# Patient Record
Sex: MALE | Race: WHITE | NOT HISPANIC OR LATINO | URBAN - METROPOLITAN AREA
[De-identification: names, ages, dates, MRNs, and addresses within clinical notes are randomized per-mention and may not be internally consistent; named-entity substitution may affect disease eponyms.]

---

## 2017-02-16 ENCOUNTER — EMERGENCY (EMERGENCY)
Facility: HOSPITAL | Age: 72
LOS: 0 days | Discharge: HOME | End: 2017-02-16
Attending: EMERGENCY MEDICINE
Payer: MEDICARE

## 2017-02-16 VITALS
TEMPERATURE: 98 F | OXYGEN SATURATION: 99 % | RESPIRATION RATE: 18 BRPM | HEIGHT: 70 IN | SYSTOLIC BLOOD PRESSURE: 134 MMHG | DIASTOLIC BLOOD PRESSURE: 79 MMHG | HEART RATE: 91 BPM | WEIGHT: 169.98 LBS

## 2017-02-16 DIAGNOSIS — Z90.5 ACQUIRED ABSENCE OF KIDNEY: Chronic | ICD-10-CM

## 2017-02-16 DIAGNOSIS — Z79.82 LONG TERM (CURRENT) USE OF ASPIRIN: ICD-10-CM

## 2017-02-16 DIAGNOSIS — M25.561 PAIN IN RIGHT KNEE: ICD-10-CM

## 2017-02-16 PROCEDURE — 73562 X-RAY EXAM OF KNEE 3: CPT | Mod: 26,RT

## 2017-02-16 PROCEDURE — 99283 EMERGENCY DEPT VISIT LOW MDM: CPT

## 2017-02-16 RX ORDER — IBUPROFEN 200 MG
600 TABLET ORAL ONCE
Qty: 0 | Refills: 0 | Status: COMPLETED | OUTPATIENT
Start: 2017-02-16 | End: 2017-02-16

## 2017-02-16 RX ORDER — TRAMADOL HYDROCHLORIDE 50 MG/1
1 TABLET ORAL
Qty: 12 | Refills: 0 | OUTPATIENT
Start: 2017-02-16 | End: 2017-02-19

## 2017-02-16 RX ADMIN — Medication 600 MILLIGRAM(S): at 12:21

## 2017-02-16 RX ADMIN — Medication 600 MILLIGRAM(S): at 11:50

## 2017-02-16 NOTE — ED PROVIDER NOTE - OBJECTIVE STATEMENT
72yo male with pmh HL presents with rt knee pain and swelling x 4 days. No analgesia taken.  Pt with similar pain 3 years ago and had his orthopedist tap it and states it was "joint fluid" and was not give any abx.  Pt has appt on 2/27 with ortho. denies fever, trauma, erythema. Ortho: dr. penny caal [phone]    No fever/chills. No photophobia/eye pain/changes in vision, No ear pain/sore throat/dysphagia, No chest pain/palpitations. No SOB/cough/stridor. No abdominal pain, N/V/D, no black/bloody bm. No dysuria/frequency/discharge, No headache. No Dizziness.  No rash.  No numbness/tingling/weakness.

## 2017-02-16 NOTE — ED PROVIDER NOTE - MEDICAL DECISION MAKING DETAILS
Pt well appearing, unlikely septic joint, likely gout vs pseudogout given history of similar in past.  pt has appt with orthopedist. for nsaids and fu. Discussed results and outcome of testing with the patient.  Patient given copy of available results. Patient advised to please follow up with their PMD within the next 24 hours and return to the Emergency Department for worsening symptoms or any other concerns.

## 2017-02-16 NOTE — ED ADULT NURSE NOTE - OBJECTIVE STATEMENT
Patient stated that he has been having pain to right knee for the past 4 days, denies injury, denies fall

## 2017-02-16 NOTE — ED ADULT TRIAGE NOTE - CHIEF COMPLAINT QUOTE
swelling and pain to right knee times 4 days. pt denies any injury. pt has history of high cholesterol

## 2017-02-16 NOTE — ED ADULT NURSE NOTE - PMH
BPH (benign prostatic hyperplasia)    History of kidney cancer  right kidney removed  HLD (hyperlipidemia)

## 2017-02-21 ENCOUNTER — APPOINTMENT (OUTPATIENT)
Dept: ORTHOPEDIC SURGERY | Facility: CLINIC | Age: 72
End: 2017-02-21

## 2017-02-21 VITALS
HEIGHT: 70 IN | WEIGHT: 170 LBS | BODY MASS INDEX: 24.34 KG/M2 | HEART RATE: 88 BPM | SYSTOLIC BLOOD PRESSURE: 136 MMHG | DIASTOLIC BLOOD PRESSURE: 81 MMHG

## 2017-02-27 ENCOUNTER — APPOINTMENT (OUTPATIENT)
Dept: ORTHOPEDIC SURGERY | Facility: CLINIC | Age: 72
End: 2017-02-27

## 2017-03-03 ENCOUNTER — APPOINTMENT (OUTPATIENT)
Dept: ORTHOPEDIC SURGERY | Facility: CLINIC | Age: 72
End: 2017-03-03

## 2017-03-03 VITALS
DIASTOLIC BLOOD PRESSURE: 64 MMHG | HEART RATE: 73 BPM | SYSTOLIC BLOOD PRESSURE: 109 MMHG | BODY MASS INDEX: 24.34 KG/M2 | WEIGHT: 170 LBS | HEIGHT: 70 IN

## 2017-03-09 ENCOUNTER — APPOINTMENT (OUTPATIENT)
Dept: ORTHOPEDIC SURGERY | Facility: CLINIC | Age: 72
End: 2017-03-09

## 2017-03-09 VITALS
BODY MASS INDEX: 24.34 KG/M2 | WEIGHT: 170 LBS | HEART RATE: 84 BPM | DIASTOLIC BLOOD PRESSURE: 76 MMHG | SYSTOLIC BLOOD PRESSURE: 115 MMHG | HEIGHT: 70 IN

## 2017-03-15 ENCOUNTER — APPOINTMENT (OUTPATIENT)
Dept: ORTHOPEDIC SURGERY | Facility: CLINIC | Age: 72
End: 2017-03-15

## 2017-03-23 ENCOUNTER — APPOINTMENT (OUTPATIENT)
Dept: ORTHOPEDIC SURGERY | Facility: CLINIC | Age: 72
End: 2017-03-23

## 2017-03-23 VITALS — HEART RATE: 90 BPM | SYSTOLIC BLOOD PRESSURE: 108 MMHG | DIASTOLIC BLOOD PRESSURE: 67 MMHG

## 2017-04-13 ENCOUNTER — APPOINTMENT (OUTPATIENT)
Dept: ORTHOPEDIC SURGERY | Facility: CLINIC | Age: 72
End: 2017-04-13

## 2017-04-13 ENCOUNTER — FORM ENCOUNTER (OUTPATIENT)
Age: 72
End: 2017-04-13

## 2017-04-13 VITALS
HEART RATE: 78 BPM | HEIGHT: 70 IN | SYSTOLIC BLOOD PRESSURE: 120 MMHG | DIASTOLIC BLOOD PRESSURE: 74 MMHG | BODY MASS INDEX: 24.34 KG/M2 | WEIGHT: 170 LBS

## 2017-04-14 ENCOUNTER — APPOINTMENT (OUTPATIENT)
Dept: MRI IMAGING | Facility: CLINIC | Age: 72
End: 2017-04-14

## 2017-04-14 ENCOUNTER — OUTPATIENT (OUTPATIENT)
Dept: OUTPATIENT SERVICES | Facility: HOSPITAL | Age: 72
LOS: 1 days | End: 2017-04-14
Payer: MEDICARE

## 2017-04-14 DIAGNOSIS — Z90.5 ACQUIRED ABSENCE OF KIDNEY: Chronic | ICD-10-CM

## 2017-04-14 DIAGNOSIS — M17.11 UNILATERAL PRIMARY OSTEOARTHRITIS, RIGHT KNEE: ICD-10-CM

## 2017-04-14 PROCEDURE — 73721 MRI JNT OF LWR EXTRE W/O DYE: CPT

## 2017-04-17 ENCOUNTER — RESULT REVIEW (OUTPATIENT)
Age: 72
End: 2017-04-17

## 2017-04-18 ENCOUNTER — RESULT REVIEW (OUTPATIENT)
Age: 72
End: 2017-04-18

## 2017-05-05 ENCOUNTER — OUTPATIENT (OUTPATIENT)
Dept: OUTPATIENT SERVICES | Facility: HOSPITAL | Age: 72
LOS: 1 days | End: 2017-05-05
Payer: MEDICARE

## 2017-05-05 VITALS
HEART RATE: 82 BPM | WEIGHT: 166.01 LBS | HEIGHT: 68 IN | DIASTOLIC BLOOD PRESSURE: 80 MMHG | RESPIRATION RATE: 18 BRPM | SYSTOLIC BLOOD PRESSURE: 138 MMHG | TEMPERATURE: 97 F

## 2017-05-05 DIAGNOSIS — M19.90 UNSPECIFIED OSTEOARTHRITIS, UNSPECIFIED SITE: ICD-10-CM

## 2017-05-05 DIAGNOSIS — S83.241A OTHER TEAR OF MEDIAL MENISCUS, CURRENT INJURY, RIGHT KNEE, INITIAL ENCOUNTER: ICD-10-CM

## 2017-05-05 DIAGNOSIS — Z90.5 ACQUIRED ABSENCE OF KIDNEY: Chronic | ICD-10-CM

## 2017-05-05 LAB
BUN SERPL-MCNC: 16 MG/DL — SIGNIFICANT CHANGE UP (ref 7–23)
CALCIUM SERPL-MCNC: 9.9 MG/DL — SIGNIFICANT CHANGE UP (ref 8.4–10.5)
CHLORIDE SERPL-SCNC: 99 MMOL/L — SIGNIFICANT CHANGE UP (ref 98–107)
CO2 SERPL-SCNC: 28 MMOL/L — SIGNIFICANT CHANGE UP (ref 22–31)
CREAT SERPL-MCNC: 1.19 MG/DL — SIGNIFICANT CHANGE UP (ref 0.5–1.3)
GLUCOSE SERPL-MCNC: 106 MG/DL — HIGH (ref 70–99)
HCT VFR BLD CALC: 45.8 % — SIGNIFICANT CHANGE UP (ref 39–50)
HGB BLD-MCNC: 15.1 G/DL — SIGNIFICANT CHANGE UP (ref 13–17)
MCHC RBC-ENTMCNC: 30.3 PG — SIGNIFICANT CHANGE UP (ref 27–34)
MCHC RBC-ENTMCNC: 33 % — SIGNIFICANT CHANGE UP (ref 32–36)
MCV RBC AUTO: 92 FL — SIGNIFICANT CHANGE UP (ref 80–100)
PLATELET # BLD AUTO: 288 K/UL — SIGNIFICANT CHANGE UP (ref 150–400)
PMV BLD: 11.4 FL — SIGNIFICANT CHANGE UP (ref 7–13)
POTASSIUM SERPL-MCNC: 3.9 MMOL/L — SIGNIFICANT CHANGE UP (ref 3.5–5.3)
POTASSIUM SERPL-SCNC: 3.9 MMOL/L — SIGNIFICANT CHANGE UP (ref 3.5–5.3)
RBC # BLD: 4.98 M/UL — SIGNIFICANT CHANGE UP (ref 4.2–5.8)
RBC # FLD: 13.5 % — SIGNIFICANT CHANGE UP (ref 10.3–14.5)
SODIUM SERPL-SCNC: 140 MMOL/L — SIGNIFICANT CHANGE UP (ref 135–145)
WBC # BLD: 8.39 K/UL — SIGNIFICANT CHANGE UP (ref 3.8–10.5)
WBC # FLD AUTO: 8.39 K/UL — SIGNIFICANT CHANGE UP (ref 3.8–10.5)

## 2017-05-05 PROCEDURE — 93010 ELECTROCARDIOGRAM REPORT: CPT

## 2017-05-05 NOTE — H&P PST ADULT - VENOUS THROMBOEMBOLISM CURRENT STATUS
major surgery, including arthroscopic and laparoscopic (greater than 1 hr) major surgery lasting 2-3 hrs

## 2017-05-05 NOTE — H&P PST ADULT - PMH
BPH (benign prostatic hyperplasia)    History of kidney cancer  right kidney removed  HLD (hyperlipidemia)    Osteoarthritis BPH (benign prostatic hyperplasia)    History of kidney cancer  right kidney removed  HLD (hyperlipidemia)    Osteoarthritis    URI (upper respiratory infection)  2 wks ago and took Augmentin

## 2017-05-05 NOTE — H&P PST ADULT - PROBLEM SELECTOR PLAN 1
Scheduled for right knee arthroscopy and menisectomy on 5/18/17.   labs pending,  EKG in chart,  Preop instructions provided to pt,  Famotidine and chlorhexidine scrub provided  to pt.

## 2017-05-05 NOTE — H&P PST ADULT - NEGATIVE GASTROINTESTINAL SYMPTOMS
no change in bowel habits/no diarrhea/no nausea/no vomiting/no constipation/no melena/no abdominal pain/no hematochezia

## 2017-05-05 NOTE — H&P PST ADULT - NEGATIVE ENMT SYMPTOMS
no tinnitus/no dry mouth/no ear pain/no sinus symptoms/no dysphagia/no recurrent cold sores/no throat pain/no vertigo/no nasal obstruction/no hearing difficulty/no post-nasal discharge/no nose bleeds

## 2017-05-05 NOTE — H&P PST ADULT - HISTORY OF PRESENT ILLNESS
69 yo male with h/o HLD and right nephrectomy, and with h/o osteoarthritis presents for preop eval to have  right knee arthroscopy and menisectomy on 5/18/17. Pt stated that he started having pain 2 months ago and received cortisone injection x 1 with some relief of pain but the right knee continues to hurt. MRI showed torn meniscus.

## 2017-05-05 NOTE — H&P PST ADULT - NSANTHOSAYNRD_GEN_A_CORE
No. CASSIDY screening performed.  STOP BANG Legend: 0-2 = LOW Risk; 3-4 = INTERMEDIATE Risk; 5-8 = HIGH Risk/never tested

## 2017-05-05 NOTE — H&P PST ADULT - ASSESSMENT
71 yo male with h/o HLD and right nephrectomy, and with h/o osteoarthritis presents for preop eval to have  right knee arthroscopy and menisectomy on 5/18/17.

## 2017-05-05 NOTE — H&P PST ADULT - RS GEN PE MLT RESP DETAILS PC
no wheezes/good air movement/no rhonchi/breath sounds equal/no subcutaneous emphysema/clear to auscultation bilaterally/airway patent/no intercostal retractions/no rales

## 2017-05-18 ENCOUNTER — OUTPATIENT (OUTPATIENT)
Dept: OUTPATIENT SERVICES | Facility: HOSPITAL | Age: 72
LOS: 1 days | Discharge: HOME | End: 2017-05-18
Payer: MEDICARE

## 2017-05-18 ENCOUNTER — TRANSCRIPTION ENCOUNTER (OUTPATIENT)
Age: 72
End: 2017-05-18

## 2017-05-18 ENCOUNTER — APPOINTMENT (OUTPATIENT)
Dept: ORTHOPEDIC SURGERY | Facility: AMBULATORY SURGERY CENTER | Age: 72
End: 2017-05-18

## 2017-05-18 VITALS
TEMPERATURE: 98 F | HEIGHT: 68 IN | DIASTOLIC BLOOD PRESSURE: 74 MMHG | SYSTOLIC BLOOD PRESSURE: 134 MMHG | HEART RATE: 88 BPM | OXYGEN SATURATION: 98 % | RESPIRATION RATE: 16 BRPM | WEIGHT: 166.01 LBS

## 2017-05-18 VITALS
OXYGEN SATURATION: 99 % | SYSTOLIC BLOOD PRESSURE: 114 MMHG | DIASTOLIC BLOOD PRESSURE: 76 MMHG | TEMPERATURE: 98 F | HEART RATE: 80 BPM | RESPIRATION RATE: 12 BRPM

## 2017-05-18 DIAGNOSIS — S83.241A OTHER TEAR OF MEDIAL MENISCUS, CURRENT INJURY, RIGHT KNEE, INITIAL ENCOUNTER: ICD-10-CM

## 2017-05-18 DIAGNOSIS — Z90.5 ACQUIRED ABSENCE OF KIDNEY: Chronic | ICD-10-CM

## 2017-05-18 PROCEDURE — 29881 ARTHRS KNE SRG MNISECTMY M/L: CPT | Mod: RT

## 2017-05-18 RX ORDER — ASPIRIN/CALCIUM CARB/MAGNESIUM 324 MG
1 TABLET ORAL
Qty: 14 | Refills: 0 | OUTPATIENT
Start: 2017-05-18 | End: 2017-06-01

## 2017-05-18 NOTE — ASU DISCHARGE PLAN (ADULT/PEDIATRIC). - NOTIFY
Bleeding that does not stop/Swelling that continues/Persistent Nausea and Vomiting/Unable to Urinate/Numbness, color, or temperature change to extremity/Fever greater than 101/Pain not relieved by Medications

## 2017-05-18 NOTE — ASU DISCHARGE PLAN (ADULT/PEDIATRIC). - ACTIVITY LEVEL
no sports/gym/No strenuous activity. No sports or running./no heavy lifting/no exercise/elevate extremity

## 2017-05-18 NOTE — ASU DISCHARGE PLAN (ADULT/PEDIATRIC). - SPECIAL INSTRUCTIONS
Please refer to MD pre-printed instruction sheet.  Apply ice packs on top of bandage every 2 hours for 20 minutes at a time.   Resume leg strengthening exercises/physical therapy as directed by Dr. Cruz.

## 2017-05-26 ENCOUNTER — APPOINTMENT (OUTPATIENT)
Dept: ORTHOPEDIC SURGERY | Facility: CLINIC | Age: 72
End: 2017-05-26

## 2017-05-26 VITALS
HEART RATE: 85 BPM | HEIGHT: 70 IN | DIASTOLIC BLOOD PRESSURE: 85 MMHG | BODY MASS INDEX: 24.34 KG/M2 | WEIGHT: 170 LBS | SYSTOLIC BLOOD PRESSURE: 144 MMHG

## 2017-06-29 ENCOUNTER — APPOINTMENT (OUTPATIENT)
Dept: ORTHOPEDIC SURGERY | Facility: CLINIC | Age: 72
End: 2017-06-29

## 2017-06-29 VITALS
SYSTOLIC BLOOD PRESSURE: 117 MMHG | HEIGHT: 70 IN | BODY MASS INDEX: 24.34 KG/M2 | WEIGHT: 170 LBS | HEART RATE: 88 BPM | DIASTOLIC BLOOD PRESSURE: 76 MMHG

## 2017-06-29 DIAGNOSIS — S83.231D COMPLEX TEAR OF MEDIAL MENISCUS, CURRENT INJURY, RIGHT KNEE, SUBSEQUENT ENCOUNTER: ICD-10-CM

## 2018-03-06 ENCOUNTER — APPOINTMENT (OUTPATIENT)
Dept: ORTHOPEDIC SURGERY | Facility: CLINIC | Age: 73
End: 2018-03-06
Payer: MEDICARE

## 2018-03-06 VITALS — DIASTOLIC BLOOD PRESSURE: 76 MMHG | HEART RATE: 85 BPM | SYSTOLIC BLOOD PRESSURE: 120 MMHG

## 2018-03-06 DIAGNOSIS — M17.11 UNILATERAL PRIMARY OSTEOARTHRITIS, RIGHT KNEE: ICD-10-CM

## 2018-03-06 PROCEDURE — 20610 DRAIN/INJ JOINT/BURSA W/O US: CPT | Mod: RT

## 2018-03-06 PROCEDURE — 99213 OFFICE O/P EST LOW 20 MIN: CPT | Mod: 25

## 2018-06-27 ENCOUNTER — APPOINTMENT (OUTPATIENT)
Dept: UROLOGY | Facility: CLINIC | Age: 73
End: 2018-06-27
Payer: MEDICARE

## 2018-06-27 VITALS
HEART RATE: 85 BPM | DIASTOLIC BLOOD PRESSURE: 81 MMHG | SYSTOLIC BLOOD PRESSURE: 123 MMHG | RESPIRATION RATE: 16 BRPM | HEIGHT: 70 IN | TEMPERATURE: 98.1 F

## 2018-06-27 LAB
ANION GAP SERPL CALC-SCNC: 16 MMOL/L
APPEARANCE: CLEAR
BACTERIA: NEGATIVE
BILIRUBIN URINE: NEGATIVE
BLOOD URINE: NEGATIVE
BUN SERPL-MCNC: 14 MG/DL
CALCIUM SERPL-MCNC: 9.4 MG/DL
CHLORIDE SERPL-SCNC: 99 MMOL/L
CO2 SERPL-SCNC: 24 MMOL/L
COLOR: YELLOW
CREAT SERPL-MCNC: 1.09 MG/DL
GLUCOSE QUALITATIVE U: NEGATIVE MG/DL
GLUCOSE SERPL-MCNC: 108 MG/DL
KETONES URINE: NEGATIVE
LEUKOCYTE ESTERASE URINE: NEGATIVE
MICROSCOPIC-UA: NORMAL
NITRITE URINE: NEGATIVE
PH URINE: 6
POTASSIUM SERPL-SCNC: 4.4 MMOL/L
PROTEIN URINE: NEGATIVE MG/DL
RED BLOOD CELLS URINE: 1 /HPF
SODIUM SERPL-SCNC: 139 MMOL/L
SPECIFIC GRAVITY URINE: 1.01
SQUAMOUS EPITHELIAL CELLS: 0 /HPF
UROBILINOGEN URINE: NEGATIVE MG/DL
WHITE BLOOD CELLS URINE: 0 /HPF

## 2018-06-27 PROCEDURE — 99204 OFFICE O/P NEW MOD 45 MIN: CPT

## 2018-06-28 LAB
PSA FREE FLD-MCNC: 29.6
PSA FREE SERPL-MCNC: 0.59 NG/ML
PSA SERPL-MCNC: 1.99 NG/ML

## 2018-06-29 LAB
BACTERIA UR CULT: NORMAL
CORE LAB FLUID CYTOLOGY: NORMAL

## 2018-12-12 PROBLEM — M19.90 UNSPECIFIED OSTEOARTHRITIS, UNSPECIFIED SITE: Chronic | Status: ACTIVE | Noted: 2017-05-05

## 2018-12-17 ENCOUNTER — OUTPATIENT (OUTPATIENT)
Dept: OUTPATIENT SERVICES | Facility: HOSPITAL | Age: 73
LOS: 1 days | End: 2018-12-17
Payer: MEDICARE

## 2018-12-17 VITALS
HEIGHT: 68 IN | SYSTOLIC BLOOD PRESSURE: 130 MMHG | OXYGEN SATURATION: 99 % | WEIGHT: 175.05 LBS | DIASTOLIC BLOOD PRESSURE: 84 MMHG | HEART RATE: 83 BPM | TEMPERATURE: 97 F | RESPIRATION RATE: 16 BRPM

## 2018-12-17 DIAGNOSIS — Z98.890 OTHER SPECIFIED POSTPROCEDURAL STATES: Chronic | ICD-10-CM

## 2018-12-17 DIAGNOSIS — R91.1 SOLITARY PULMONARY NODULE: ICD-10-CM

## 2018-12-17 DIAGNOSIS — Z90.5 ACQUIRED ABSENCE OF KIDNEY: Chronic | ICD-10-CM

## 2018-12-17 LAB
BUN SERPL-MCNC: 15 MG/DL — SIGNIFICANT CHANGE UP (ref 7–23)
CALCIUM SERPL-MCNC: 9.3 MG/DL — SIGNIFICANT CHANGE UP (ref 8.4–10.5)
CHLORIDE SERPL-SCNC: 100 MMOL/L — SIGNIFICANT CHANGE UP (ref 98–107)
CO2 SERPL-SCNC: 27 MMOL/L — SIGNIFICANT CHANGE UP (ref 22–31)
CREAT SERPL-MCNC: 1.18 MG/DL — SIGNIFICANT CHANGE UP (ref 0.5–1.3)
GLUCOSE SERPL-MCNC: 97 MG/DL — SIGNIFICANT CHANGE UP (ref 70–99)
HCT VFR BLD CALC: 44.3 % — SIGNIFICANT CHANGE UP (ref 39–50)
HGB BLD-MCNC: 14.4 G/DL — SIGNIFICANT CHANGE UP (ref 13–17)
MCHC RBC-ENTMCNC: 30.3 PG — SIGNIFICANT CHANGE UP (ref 27–34)
MCHC RBC-ENTMCNC: 32.5 % — SIGNIFICANT CHANGE UP (ref 32–36)
MCV RBC AUTO: 93.3 FL — SIGNIFICANT CHANGE UP (ref 80–100)
NRBC # FLD: 0 — SIGNIFICANT CHANGE UP
PLATELET # BLD AUTO: 243 K/UL — SIGNIFICANT CHANGE UP (ref 150–400)
PMV BLD: 11.4 FL — SIGNIFICANT CHANGE UP (ref 7–13)
POTASSIUM SERPL-MCNC: 4 MMOL/L — SIGNIFICANT CHANGE UP (ref 3.5–5.3)
POTASSIUM SERPL-SCNC: 4 MMOL/L — SIGNIFICANT CHANGE UP (ref 3.5–5.3)
RBC # BLD: 4.75 M/UL — SIGNIFICANT CHANGE UP (ref 4.2–5.8)
RBC # FLD: 12.9 % — SIGNIFICANT CHANGE UP (ref 10.3–14.5)
SODIUM SERPL-SCNC: 140 MMOL/L — SIGNIFICANT CHANGE UP (ref 135–145)
WBC # BLD: 9.06 K/UL — SIGNIFICANT CHANGE UP (ref 3.8–10.5)
WBC # FLD AUTO: 9.06 K/UL — SIGNIFICANT CHANGE UP (ref 3.8–10.5)

## 2018-12-17 PROCEDURE — 93010 ELECTROCARDIOGRAM REPORT: CPT

## 2018-12-17 RX ORDER — SODIUM CHLORIDE 9 MG/ML
1000 INJECTION, SOLUTION INTRAVENOUS
Qty: 0 | Refills: 0 | Status: DISCONTINUED | OUTPATIENT
Start: 2019-01-03 | End: 2019-01-18

## 2018-12-17 RX ORDER — ATORVASTATIN CALCIUM 80 MG/1
1 TABLET, FILM COATED ORAL
Qty: 0 | Refills: 0 | COMMUNITY

## 2018-12-17 NOTE — H&P PST ADULT - PROBLEM SELECTOR PLAN 1
Scheduled for right knee arthroscopy and menisectomy on 5/18/17.   labs pending,  EKG in chart,  Preop instructions provided to pt,  Famotidine and chlorhexidine scrub provided  to pt. Pt is scheduled for navigation endobronchial ultrasound bronchoscopy, radial endobronchial ultrasounds bronchoscopy fluoro for 1/3/19. Pre-op instructions provided. Pepcid provided for GI prophylaxis. Pt stated understanding.     Takes ASA for cardiac prophylaxis, last dose on 12/27.    CASSIDY precautions, OR booking notified    Pending copy of stress test from 2018.

## 2018-12-17 NOTE — H&P PST ADULT - PMH
BPH (benign prostatic hyperplasia)    History of kidney cancer  right kidney removed  HLD (hyperlipidemia)    Osteoarthritis    Solitary pulmonary nodule

## 2018-12-17 NOTE — H&P PST ADULT - NSANTHOSAYNRD_GEN_A_CORE
never tested/No. CASSIDY screening performed.  STOP BANG Legend: 0-2 = LOW Risk; 3-4 = INTERMEDIATE Risk; 5-8 = HIGH Risk

## 2018-12-17 NOTE — H&P PST ADULT - MUSCULOSKELETAL
detailed exam details… ROM intact/no joint warmth/no calf tenderness/normal strength/no joint swelling/no joint erythema

## 2018-12-17 NOTE — H&P PST ADULT - PRIMARY CARE PROVIDER
Dr Crisostomo  [phone]                    Dr Soni Cards  Dr Crisostomo  [phone]                                                           Dr Soni Cards

## 2018-12-17 NOTE — H&P PST ADULT - NEGATIVE NEUROLOGICAL SYMPTOMS
no paresthesias/no generalized seizures/no syncope/no tremors/no loss of sensation/no headache/no difficulty walking/no transient paralysis/no weakness

## 2018-12-17 NOTE — H&P PST ADULT - HISTORY OF PRESENT ILLNESS
73 year old male presents to presurgical testing with diagnosis of solitary pulmonary nodule scheduled for navigation endobronchial ultrasound bronchoscopy, radial endobronchial ultrasounds bronchoscopy fluoro for 1/3/19. Pt with hx of pulmonary nodule, on right side, found incidentally 2 years ago, surveillance CT chest found an increase in size of pulmonary nodule. Pt denies cough or shortness of breath.

## 2018-12-17 NOTE — H&P PST ADULT - NEGATIVE ENMT SYMPTOMS
no ear pain/no tinnitus/no nasal obstruction/no post-nasal discharge/no nose bleeds/no vertigo/no sinus symptoms/no recurrent cold sores/no dry mouth/no throat pain/no dysphagia/no hearing difficulty

## 2018-12-17 NOTE — H&P PST ADULT - RS GEN PE MLT RESP DETAILS PC
clear to auscultation bilaterally/no rhonchi/no wheezes/breath sounds equal/good air movement/no rales/airway patent

## 2018-12-17 NOTE — H&P PST ADULT - NEGATIVE OPHTHALMOLOGIC SYMPTOMS
no blurred vision L/no pain R/no loss of vision L/no diplopia/no blurred vision R/no loss of vision R/no photophobia/no pain L

## 2018-12-17 NOTE — H&P PST ADULT - NEGATIVE GASTROINTESTINAL SYMPTOMS
no constipation/no change in bowel habits/no nausea/no vomiting/no abdominal pain/no hematochezia/no diarrhea/no melena

## 2018-12-18 PROBLEM — J06.9 ACUTE UPPER RESPIRATORY INFECTION, UNSPECIFIED: Chronic | Status: INACTIVE | Noted: 2017-05-05 | Resolved: 2018-12-17

## 2019-01-02 ENCOUNTER — FORM ENCOUNTER (OUTPATIENT)
Age: 74
End: 2019-01-02

## 2019-01-03 ENCOUNTER — RESULT REVIEW (OUTPATIENT)
Age: 74
End: 2019-01-03

## 2019-01-03 ENCOUNTER — OUTPATIENT (OUTPATIENT)
Dept: OUTPATIENT SERVICES | Facility: HOSPITAL | Age: 74
LOS: 1 days | Discharge: HOME | End: 2019-01-03
Payer: MEDICARE

## 2019-01-03 ENCOUNTER — APPOINTMENT (OUTPATIENT)
Dept: PULMONOLOGY | Facility: HOSPITAL | Age: 74
End: 2019-01-03

## 2019-01-03 VITALS
OXYGEN SATURATION: 96 % | SYSTOLIC BLOOD PRESSURE: 100 MMHG | DIASTOLIC BLOOD PRESSURE: 85 MMHG | RESPIRATION RATE: 16 BRPM | HEART RATE: 84 BPM

## 2019-01-03 VITALS
SYSTOLIC BLOOD PRESSURE: 135 MMHG | DIASTOLIC BLOOD PRESSURE: 81 MMHG | HEART RATE: 92 BPM | HEIGHT: 68 IN | OXYGEN SATURATION: 98 % | RESPIRATION RATE: 16 BRPM | TEMPERATURE: 98 F | WEIGHT: 175.05 LBS

## 2019-01-03 DIAGNOSIS — Z98.890 OTHER SPECIFIED POSTPROCEDURAL STATES: Chronic | ICD-10-CM

## 2019-01-03 DIAGNOSIS — Z90.5 ACQUIRED ABSENCE OF KIDNEY: Chronic | ICD-10-CM

## 2019-01-03 DIAGNOSIS — R91.1 SOLITARY PULMONARY NODULE: ICD-10-CM

## 2019-01-03 LAB
GRAM STN SPT: SIGNIFICANT CHANGE UP
SPECIMEN SOURCE: SIGNIFICANT CHANGE UP

## 2019-01-03 PROCEDURE — 31624 DX BRONCHOSCOPE/LAVAGE: CPT | Mod: GC

## 2019-01-03 PROCEDURE — 31627 NAVIGATIONAL BRONCHOSCOPY: CPT | Mod: GC

## 2019-01-03 PROCEDURE — 71045 X-RAY EXAM CHEST 1 VIEW: CPT | Mod: 26

## 2019-01-03 PROCEDURE — 88312 SPECIAL STAINS GROUP 1: CPT | Mod: 26

## 2019-01-03 PROCEDURE — 31654 BRONCH EBUS IVNTJ PERPH LES: CPT | Mod: GC

## 2019-01-03 PROCEDURE — 88305 TISSUE EXAM BY PATHOLOGIST: CPT | Mod: 26

## 2019-01-03 PROCEDURE — 88173 CYTOPATH EVAL FNA REPORT: CPT | Mod: 26

## 2019-01-03 PROCEDURE — 88112 CYTOPATH CELL ENHANCE TECH: CPT | Mod: 26,59

## 2019-01-03 PROCEDURE — 31625 BRONCHOSCOPY W/BIOPSY(S): CPT | Mod: GC

## 2019-01-03 RX ORDER — ONDANSETRON 8 MG/1
4 TABLET, FILM COATED ORAL ONCE
Qty: 0 | Refills: 0 | Status: DISCONTINUED | OUTPATIENT
Start: 2019-01-03 | End: 2019-01-03

## 2019-01-03 RX ORDER — SODIUM CHLORIDE 9 MG/ML
1000 INJECTION, SOLUTION INTRAVENOUS
Qty: 0 | Refills: 0 | Status: DISCONTINUED | OUTPATIENT
Start: 2019-01-03 | End: 2019-01-03

## 2019-01-03 RX ORDER — FENTANYL CITRATE 50 UG/ML
25 INJECTION INTRAVENOUS
Qty: 0 | Refills: 0 | Status: DISCONTINUED | OUTPATIENT
Start: 2019-01-03 | End: 2019-01-03

## 2019-01-03 NOTE — ASU DISCHARGE PLAN (ADULT/PEDIATRIC). - POST OP PHONE #
[phone] pt. granted permission to leave message /and or speak with whoever answers the phone.

## 2019-01-03 NOTE — ASU DISCHARGE PLAN (ADULT/PEDIATRIC). - NOTIFY
Unable to Urinate/Fever greater than 101/Bleeding that does not stop/Swelling that continues Persistent Nausea and Vomiting/Inability to Tolerate Liquids or Foods/Bleeding that does not stop/Fever greater than 101/Unable to Urinate/Swelling that continues

## 2019-01-03 NOTE — ASU DISCHARGE PLAN (ADULT/PEDIATRIC). - MEDICATION SUMMARY - MEDICATIONS TO TAKE
I will START or STAY ON the medications listed below when I get home from the hospital:    aspirin 81 mg oral tablet  -- 1 tab(s) by mouth once a day. last dsoe 12/27/2018  -- Indication: For prophylaxis    Flomax 0.4 mg oral capsule  -- 1 cap(s) by mouth once a day  -- Indication: For BPH    atorvastatin 10 mg oral tablet  -- 1 tab(s) by mouth once a day  -- Indication: For HLD    Garlic oral tablet  -- orally once a day. last dsoe 12/27/2018  -- Indication: For prophylaxis    Singulair 10 mg oral tablet  -- 1 tab(s) by mouth once a day  -- Indication: For rhinits    Multiple Vitamins oral tablet, dispersible  -- 1 tab(s) by mouth once a day. last dose 12/27/2018  -- Indication: For prophylaxis    Vitamin C  -- 1 tab(s) by mouth once a day  -- Indication: For prophylaxis

## 2019-01-04 PROBLEM — R91.1 SOLITARY PULMONARY NODULE: Chronic | Status: ACTIVE | Noted: 2018-12-17

## 2019-01-04 LAB
CULTURE - ACID FAST SMEAR CONCENTRATED: SIGNIFICANT CHANGE UP
SPECIMEN SOURCE: SIGNIFICANT CHANGE UP
SPECIMEN SOURCE: SIGNIFICANT CHANGE UP

## 2019-01-07 LAB — BACTERIA SPT RESP CULT: SIGNIFICANT CHANGE UP

## 2019-01-10 LAB — SPECIMEN SOURCE: SIGNIFICANT CHANGE UP

## 2019-01-16 ENCOUNTER — APPOINTMENT (OUTPATIENT)
Dept: UROLOGY | Facility: CLINIC | Age: 74
End: 2019-01-16
Payer: MEDICARE

## 2019-01-16 PROCEDURE — 99214 OFFICE O/P EST MOD 30 MIN: CPT

## 2019-01-17 LAB
APPEARANCE: CLEAR
BACTERIA: NEGATIVE
BILIRUBIN URINE: NEGATIVE
BLOOD URINE: NEGATIVE
COLOR: YELLOW
GLUCOSE QUALITATIVE U: NEGATIVE MG/DL
KETONES URINE: NEGATIVE
LEUKOCYTE ESTERASE URINE: NEGATIVE
MICROSCOPIC-UA: NORMAL
NITRITE URINE: NEGATIVE
PH URINE: 7
PROTEIN URINE: NEGATIVE MG/DL
PSA FREE FLD-MCNC: 24 %
PSA FREE SERPL-MCNC: 0.59 NG/ML
PSA SERPL-MCNC: 2.49 NG/ML
RED BLOOD CELLS URINE: 0 /HPF
SPECIFIC GRAVITY URINE: 1.01
SQUAMOUS EPITHELIAL CELLS: 0 /HPF
UROBILINOGEN URINE: NEGATIVE MG/DL
WHITE BLOOD CELLS URINE: 0 /HPF

## 2019-02-01 LAB — FUNGUS SPEC QL CULT: SIGNIFICANT CHANGE UP

## 2019-02-14 LAB — ACID FAST STN SPEC: SIGNIFICANT CHANGE UP

## 2019-03-06 ENCOUNTER — APPOINTMENT (OUTPATIENT)
Dept: THORACIC SURGERY | Facility: CLINIC | Age: 74
End: 2019-03-06
Payer: MEDICARE

## 2019-03-06 VITALS
WEIGHT: 178 LBS | BODY MASS INDEX: 26.36 KG/M2 | DIASTOLIC BLOOD PRESSURE: 89 MMHG | HEIGHT: 69 IN | SYSTOLIC BLOOD PRESSURE: 148 MMHG | HEART RATE: 86 BPM | OXYGEN SATURATION: 98 % | TEMPERATURE: 97.9 F | RESPIRATION RATE: 18 BRPM

## 2019-03-06 PROCEDURE — 99205 OFFICE O/P NEW HI 60 MIN: CPT

## 2019-03-06 RX ORDER — TRAMADOL HYDROCHLORIDE 50 MG/1
50 TABLET, COATED ORAL
Qty: 12 | Refills: 0 | Status: DISCONTINUED | COMMUNITY
Start: 2017-02-16 | End: 2019-03-06

## 2019-03-06 RX ORDER — PEDI MULTIVIT 22/VIT D3/VIT K 1000-800
TABLET,CHEWABLE ORAL
Refills: 0 | Status: DISCONTINUED | COMMUNITY

## 2019-03-06 RX ORDER — TAMSULOSIN HYDROCHLORIDE 0.4 MG/1
0.4 CAPSULE ORAL
Qty: 90 | Refills: 3 | Status: DISCONTINUED | COMMUNITY
Start: 2018-06-27 | End: 2019-03-06

## 2019-03-06 RX ORDER — ATORVASTATIN CALCIUM 10 MG/1
10 TABLET, FILM COATED ORAL
Refills: 0 | Status: ACTIVE | COMMUNITY
Start: 2016-07-05

## 2019-03-07 NOTE — ASSESSMENT
[FreeTextEntry1] : 72 y/o male, never smoker, with PMhx of HLD, kidney cancer s/p right nephrectomy in 1991 at [facility], who is referred by pulmonologist Dr. Arsalan Giles for evaluation of enlarging right lung nodule. \par \par CT chest on 10/17/18: subtle gradual enlargement of GGO in the RUL ( previously 11mm in 7/18/17, and 7mm in 2/1/16).  Slow growing neoplasm cannot be excluded. Stable size and distribution of scattered calcified pleural effusion. \par \par PET/CT on 2/6/19: no pulmonary hypermetabolic evidence of malignant disease. \par \par He underwent bronchoscopy with biopsy on 1/3/19: pathology was non diagnostic. \par PFT on 11/14/18: FEV1 3.72, 132% pred. FVC 4.67, 111% pred. DCO 29.18, 119%.\par \par I have reviewed the patient's medical records and diagnostic images at the time of this office consultation and have made the following recommendation.\par Plan:\par 1. Right VATS, right upper lobe wedge resection, lymph node dissection in April 2019.  All risks vs. benefits and alternatives were explained to the patient, all questions were answered, patient verbalized understanding, was in agreement with the plan to proceed.\par 2. CT guided needle localization ( lung marking) with IR on the morning prior the surgery. \par 3. Stop Aspirin 7 days prior the surgery. \par 4. Medical clearance. \par 5. PST. \par \par \par \par Written by Deloris Ruvalcaba NP, acting as a scribe for Dr. Ambrose Jacobson.       \par “The documentation recorded by the scribe accurately reflects the service I personally performed and the decisions made by me.”   Ambrose Jacobson MD.\par \par \par

## 2019-03-07 NOTE — PHYSICAL EXAM
[General Appearance - Alert] : alert [General Appearance - In No Acute Distress] : in no acute distress [Sclera] : the sclera and conjunctiva were normal [PERRL With Normal Accommodation] : pupils were equal in size, round, and reactive to light [Outer Ear] : the ears and nose were normal in appearance [Hearing Threshold Finger Rub Not [place]] : hearing was normal [Neck Appearance] : the appearance of the neck was normal [Neck Cervical Mass (___cm)] : no neck mass was observed [Respiration, Rhythm And Depth] : normal respiratory rhythm and effort [Auscultation Breath Sounds / Voice Sounds] : lungs were clear to auscultation bilaterally [Heart Rate And Rhythm] : heart rate was normal and rhythm regular [Heart Sounds] : normal S1 and S2 [Examination Of The Chest] : the chest was normal in appearance [2+] : left 2+ [No Abnormalities] : the abdominal aorta was not enlarged and no bruit was heard [No Pulse Delay] : no pulse delay [Bowel Sounds] : normal bowel sounds [Abdomen Soft] : soft [Abdomen Tenderness] : non-tender [Cervical Lymph Nodes Enlarged Posterior Bilaterally] : posterior cervical [Cervical Lymph Nodes Enlarged Anterior Bilaterally] : anterior cervical [No CVA Tenderness] : no ~M costovertebral angle tenderness [Involuntary Movements] : no involuntary movements were seen [Skin Color & Pigmentation] : normal skin color and pigmentation [Skin Turgor] : normal skin turgor [] : no rash [No Focal Deficits] : no focal deficits [Oriented To Time, Place, And Person] : oriented to person, place, and time [Affect] : the affect was normal [Mood] : the mood was normal [Fingers] :  capillary refill of the fingers was normal [FreeTextEntry1] : deferred

## 2019-03-07 NOTE — HISTORY OF PRESENT ILLNESS
[FreeTextEntry1] : 74 y/o male, never smoker, with PMhx of HLD, kidney cancer s/p right nephrectomy in 1991 at [facility], who is referred by pulmonologist Dr. Arsalan Giles for evaluation of enlarging right lung nodule. \par \par CT chest on 2/1/16: RUL 0.8cm GGN with 0.1- 0.2 solid appearing component and RUL 0.3-0.4cm GGN. B/L pleural nodularity including several partially calcified pleural- based nodules.\par \par CT chest on 10/17/18: subtle gradual enlargement of GGO in the RUL ( previously 11mm in 7/18/17, and 7mm in 2/1/16).  Slow growing neoplasm cannot be excluded. Stable size and distribution of scattered calcified pleural effusion. \par \par PET/CT on 2/6/19: no pulmonary hypermetabolic evidence of malignant disease. \par \par He underwent bronchoscopy with biopsy on 1/3/19: pathology was non diagnostic. \par PFT on 11/14/18: FEV1 3.72, 132% pred. FVC 4.67, 111% pred. DCO 29.18, 119%.\par \par He presents today for surgical consultation. The patient denies SOB, cough, chest pain, hemoptysis, palpitation, fever, recent illness, hospitalization and significant weight loss.\par

## 2019-03-07 NOTE — DATA REVIEWED
[FreeTextEntry1] : CT chest on 2/1/16: RUL 0.8cm GGN with 0.1- 0.2 solid appearing component and RUL 0.3-0.4cm GGN. B/L pleural nodularity including several partially calcified pleural- based nodules.\par \par CT chest on 10/17/18: subtle gradual enlargement of GGO in the RUL ( previously 11mm in 7/18/17, and 7mm in 2/1/16).  Slow growing neoplasm cannot be excluded. Stable size and distribution of scattered calcified pleural effusion. \par \par PET/CT on 2/6/19: no pulmonary hypermetabolic evidence of malignant disease. \par \par PFT on 11/14/18: FEV1 3.72, 132% pred. FVC 4.67, 111% pred. DCO 29.18, 119%.

## 2019-03-19 ENCOUNTER — APPOINTMENT (OUTPATIENT)
Dept: INTERVENTIONAL RADIOLOGY/VASCULAR | Facility: CLINIC | Age: 74
End: 2019-03-19
Payer: MEDICARE

## 2019-03-19 VITALS
SYSTOLIC BLOOD PRESSURE: 126 MMHG | OXYGEN SATURATION: 95 % | WEIGHT: 176 LBS | RESPIRATION RATE: 18 BRPM | DIASTOLIC BLOOD PRESSURE: 76 MMHG | HEART RATE: 86 BPM | HEIGHT: 69 IN | BODY MASS INDEX: 26.07 KG/M2

## 2019-03-19 PROCEDURE — 99204 OFFICE O/P NEW MOD 45 MIN: CPT

## 2019-03-19 RX ORDER — GARLIC 200 MG
TABLET ORAL
Refills: 0 | Status: ACTIVE | COMMUNITY

## 2019-03-19 RX ORDER — MULTIVITAMIN
TABLET ORAL
Refills: 0 | Status: ACTIVE | COMMUNITY

## 2019-03-19 RX ORDER — MONTELUKAST SODIUM 10 MG/1
10 TABLET, FILM COATED ORAL
Refills: 0 | Status: ACTIVE | COMMUNITY

## 2019-03-19 RX ORDER — FLUTICASONE PROPIONATE 50 UG/1
50 SPRAY, METERED NASAL
Qty: 32 | Refills: 0 | Status: COMPLETED | COMMUNITY
Start: 2016-07-12 | End: 2019-03-19

## 2019-03-19 RX ORDER — ASCORBIC ACID 500 MG
TABLET ORAL
Refills: 0 | Status: ACTIVE | COMMUNITY

## 2019-03-19 NOTE — PHYSICAL EXAM
[Alert] : alert [Normal Sclera/Conjunctiva] : normal sclera/conjunctiva [Normal Hearing] : hearing was normal [No Respiratory Distress] : no respiratory distress [Normal Rate] : heart rate was normal  [Normal Gait] : normal gait [No Tremors] : no tremors [Oriented x3] : oriented to person, place, and time

## 2019-04-01 ENCOUNTER — OUTPATIENT (OUTPATIENT)
Dept: OUTPATIENT SERVICES | Facility: HOSPITAL | Age: 74
LOS: 1 days | End: 2019-04-01
Payer: MEDICARE

## 2019-04-01 VITALS
TEMPERATURE: 97 F | OXYGEN SATURATION: 97 % | HEART RATE: 84 BPM | HEIGHT: 68 IN | WEIGHT: 175.05 LBS | DIASTOLIC BLOOD PRESSURE: 72 MMHG | SYSTOLIC BLOOD PRESSURE: 148 MMHG | RESPIRATION RATE: 16 BRPM

## 2019-04-01 DIAGNOSIS — Z98.890 OTHER SPECIFIED POSTPROCEDURAL STATES: Chronic | ICD-10-CM

## 2019-04-01 DIAGNOSIS — R91.8 OTHER NONSPECIFIC ABNORMAL FINDING OF LUNG FIELD: ICD-10-CM

## 2019-04-01 DIAGNOSIS — Z91.89 OTHER SPECIFIED PERSONAL RISK FACTORS, NOT ELSEWHERE CLASSIFIED: ICD-10-CM

## 2019-04-01 DIAGNOSIS — Z90.5 ACQUIRED ABSENCE OF KIDNEY: Chronic | ICD-10-CM

## 2019-04-01 LAB
ANION GAP SERPL CALC-SCNC: 13 MMO/L — SIGNIFICANT CHANGE UP (ref 7–14)
BLD GP AB SCN SERPL QL: NEGATIVE — SIGNIFICANT CHANGE UP
BUN SERPL-MCNC: 17 MG/DL — SIGNIFICANT CHANGE UP (ref 7–23)
CALCIUM SERPL-MCNC: 9.5 MG/DL — SIGNIFICANT CHANGE UP (ref 8.4–10.5)
CHLORIDE SERPL-SCNC: 100 MMOL/L — SIGNIFICANT CHANGE UP (ref 98–107)
CO2 SERPL-SCNC: 26 MMOL/L — SIGNIFICANT CHANGE UP (ref 22–31)
CREAT SERPL-MCNC: 1.1 MG/DL — SIGNIFICANT CHANGE UP (ref 0.5–1.3)
GLUCOSE SERPL-MCNC: 71 MG/DL — SIGNIFICANT CHANGE UP (ref 70–99)
HCT VFR BLD CALC: 45.3 % — SIGNIFICANT CHANGE UP (ref 39–50)
HGB BLD-MCNC: 14.3 G/DL — SIGNIFICANT CHANGE UP (ref 13–17)
MCHC RBC-ENTMCNC: 29.9 PG — SIGNIFICANT CHANGE UP (ref 27–34)
MCHC RBC-ENTMCNC: 31.6 % — LOW (ref 32–36)
MCV RBC AUTO: 94.8 FL — SIGNIFICANT CHANGE UP (ref 80–100)
NRBC # FLD: 0 K/UL — SIGNIFICANT CHANGE UP (ref 0–0)
PLATELET # BLD AUTO: 228 K/UL — SIGNIFICANT CHANGE UP (ref 150–400)
PMV BLD: 11.5 FL — SIGNIFICANT CHANGE UP (ref 7–13)
POTASSIUM SERPL-MCNC: 3.8 MMOL/L — SIGNIFICANT CHANGE UP (ref 3.5–5.3)
POTASSIUM SERPL-SCNC: 3.8 MMOL/L — SIGNIFICANT CHANGE UP (ref 3.5–5.3)
RBC # BLD: 4.78 M/UL — SIGNIFICANT CHANGE UP (ref 4.2–5.8)
RBC # FLD: 12.6 % — SIGNIFICANT CHANGE UP (ref 10.3–14.5)
RH IG SCN BLD-IMP: POSITIVE — SIGNIFICANT CHANGE UP
SODIUM SERPL-SCNC: 139 MMOL/L — SIGNIFICANT CHANGE UP (ref 135–145)
WBC # BLD: 8.33 K/UL — SIGNIFICANT CHANGE UP (ref 3.8–10.5)
WBC # FLD AUTO: 8.33 K/UL — SIGNIFICANT CHANGE UP (ref 3.8–10.5)

## 2019-04-01 PROCEDURE — 93010 ELECTROCARDIOGRAM REPORT: CPT

## 2019-04-01 RX ORDER — ASPIRIN/CALCIUM CARB/MAGNESIUM 324 MG
1 TABLET ORAL
Qty: 0 | Refills: 0 | COMMUNITY

## 2019-04-01 RX ORDER — SODIUM CHLORIDE 9 MG/ML
1000 INJECTION, SOLUTION INTRAVENOUS
Qty: 0 | Refills: 0 | Status: DISCONTINUED | OUTPATIENT
Start: 2019-04-15 | End: 2019-04-16

## 2019-04-01 RX ORDER — SODIUM CHLORIDE 9 MG/ML
3 INJECTION INTRAMUSCULAR; INTRAVENOUS; SUBCUTANEOUS EVERY 8 HOURS
Qty: 0 | Refills: 0 | Status: DISCONTINUED | OUTPATIENT
Start: 2019-04-15 | End: 2019-04-16

## 2019-04-01 RX ORDER — GARLIC 1000 MG
0 CAPSULE ORAL
Qty: 0 | Refills: 0 | COMMUNITY

## 2019-04-01 NOTE — H&P PST ADULT - NEGATIVE NEUROLOGICAL SYMPTOMS
no tremors/no headache/no facial palsy/no focal seizures/no syncope/no transient paralysis/no hemiparesis/no confusion/no weakness/no generalized seizures/no vertigo/no loss of sensation/no difficulty walking/no loss of consciousness/no paresthesias

## 2019-04-01 NOTE — H&P PST ADULT - NSICDXPASTSURGICALHX_GEN_ALL_CORE_FT
PAST SURGICAL HISTORY:  H/O unilateral nephrectomy right - 1991    S/P bronchoscopy 1/2019    S/P right knee arthroscopy 5/2017

## 2019-04-01 NOTE — H&P PST ADULT - NEGATIVE OPHTHALMOLOGIC SYMPTOMS
no loss of vision L/no blurred vision R/no pain L/no blurred vision L/no pain R/no loss of vision R/no diplopia/no photophobia

## 2019-04-01 NOTE — H&P PST ADULT - NSICDXPASTMEDICALHX_GEN_ALL_CORE_FT
PAST MEDICAL HISTORY:  BPH (benign prostatic hyperplasia)     History of kidney cancer right kidney removed    HLD (hyperlipidemia)     Osteoarthritis     Solitary pulmonary nodule

## 2019-04-01 NOTE — HISTORY OF PRESENT ILLNESS
[FreeTextEntry1] : 73 year old male, never smoker with past medical history to include HLD,  kidney cancer s/p right nephrectomy in 1991 at [facility] followed by his pulmonologist Dr. Arsalan Giles.  CT Scan of the chest on 10/17/18 revealed: subtle gradual enlargement of GGO in the RUL (previously 11mm in 7/18/17, and 7mm in 2/1/16).  PET/CT on 2/6/19: no pulmonary hypermetabolic evidence of malignant disease.  He underwent bronchoscopy with biopsy on 1/3/19: pathology was non diagnostic.   The patient was referred by Dr. Ambrose Jacobson for IR Lung Marking of RUL lung nodule day of surgery which will include a Right VATS, right upper lobe wedge resection, lymph node dissection in April 15 th 2019. \par PST on 4/1/19\par \par Currently denies having symptoms\par \par The patient denies chest pain, SOB, cough, hemoptysis, fever, chills or recent illness/hospitalization\par \par pt is on aspirin and he states he was told not to take it 7 days prior to the procedure.\par

## 2019-04-01 NOTE — H&P PST ADULT - NEGATIVE CARDIOVASCULAR SYMPTOMS
no dyspnea on exertion/no peripheral edema/no claudication/no chest pain/no paroxysmal nocturnal dyspnea/no palpitations

## 2019-04-01 NOTE — DATA REVIEWED
[FreeTextEntry1] : PET/CT and CT chest images reviewed and results discussed at length with the patient.\par

## 2019-04-01 NOTE — REVIEW OF SYSTEMS
[Fever] : no fever [Chills] : no chills [Eyesight Problems] : no eyesight problems [Loss Of Hearing] : no hearing loss [Chest Pain] : no chest pain [Palpitations] : no palpitations [Easy Bleeding] : no tendency for easy bleeding [Easy Bruising] : no tendency for easy bruising

## 2019-04-01 NOTE — H&P PST ADULT - NSICDXPROBLEM_GEN_ALL_CORE_FT
PROBLEM DIAGNOSES  Problem: Other nonspecific abnormal finding of lung field  Assessment and Plan: Scheduled for Right Video Assisted Thoracoscopy, Right Upper Lobe Wedge Resection, Mediastinal Lymph Node Dissection with Interventional Radiology Lung Marking on 4/15/2019.  Preop instructions given, pt verbalized understanding   Chlorhexidine wash and GI prophylaxis provided   Pt instructed by surgeon to obtain medical clearance. He will see his PCP on 4/4/19. PST labs and EKG to be faxed to Dr. Crisostomo      Problem: At risk for impaired skin integrity  Assessment and Plan: Pt with small red blotches/pimples to posterior chest. Advised patient to show his PCP at his medical clearance appt and to be treated if necessary PROBLEM DIAGNOSES  Problem: Other nonspecific abnormal finding of lung field  Assessment and Plan: Scheduled for Right Video Assisted Thoracoscopy, Right Upper Lobe Wedge Resection, Mediastinal Lymph Node Dissection with Interventional Radiology Lung Marking on 4/15/2019.  Preop instructions given, pt verbalized understanding   Chlorhexidine wash and GI prophylaxis provided   Pt instructed by surgeon to obtain medical clearance. He will see his PCP on 4/4/19. PST labs and EKG to be faxed to Dr. Crisostomo  Copy of medical clearance and ECHO requested in PST      Problem: At risk for impaired skin integrity  Assessment and Plan: Pt with small red blotches/pimples to posterior chest. Advised patient to show his PCP at his medical clearance appt and to be treated if necessary

## 2019-04-01 NOTE — ASSESSMENT
[FreeTextEntry1] : Reviewed recent PET/CT (2/6/19) with the patient.  Plan is for CT guided localization of a right upper lobe ground glass opacity (series 7, image 38 on PET/CT)with anesthesia.  Risks of the procedure, including but not limited to, bleeding, pneumothorax, infection and injury to the surrounding structures discussed with the patient.

## 2019-04-01 NOTE — H&P PST ADULT - RS GEN PE MLT RESP DETAILS PC
airway patent/breath sounds equal/respirations non-labored/no chest wall tenderness/good air movement/clear to auscultation bilaterally

## 2019-04-01 NOTE — H&P PST ADULT - NEGATIVE ENMT SYMPTOMS
no nose bleeds/no recurrent cold sores/no dry mouth/no post-nasal discharge/no throat pain/no dysphagia/no sinus symptoms/no vertigo/no hearing difficulty/no ear pain/no nasal obstruction/no tinnitus

## 2019-04-15 ENCOUNTER — INPATIENT (INPATIENT)
Facility: HOSPITAL | Age: 74
LOS: 0 days | Discharge: HOME | End: 2019-04-16
Attending: THORACIC SURGERY (CARDIOTHORACIC VASCULAR SURGERY) | Admitting: THORACIC SURGERY (CARDIOTHORACIC VASCULAR SURGERY)
Payer: MEDICARE

## 2019-04-15 ENCOUNTER — TRANSCRIPTION ENCOUNTER (OUTPATIENT)
Age: 74
End: 2019-04-15

## 2019-04-15 ENCOUNTER — APPOINTMENT (OUTPATIENT)
Dept: THORACIC SURGERY | Facility: HOSPITAL | Age: 74
End: 2019-04-15

## 2019-04-15 ENCOUNTER — RESULT REVIEW (OUTPATIENT)
Age: 74
End: 2019-04-15

## 2019-04-15 VITALS
WEIGHT: 175.05 LBS | SYSTOLIC BLOOD PRESSURE: 127 MMHG | TEMPERATURE: 98 F | RESPIRATION RATE: 15 BRPM | OXYGEN SATURATION: 96 % | HEART RATE: 83 BPM | DIASTOLIC BLOOD PRESSURE: 66 MMHG | HEIGHT: 68 IN

## 2019-04-15 DIAGNOSIS — Z98.890 OTHER SPECIFIED POSTPROCEDURAL STATES: Chronic | ICD-10-CM

## 2019-04-15 DIAGNOSIS — R91.8 OTHER NONSPECIFIC ABNORMAL FINDING OF LUNG FIELD: ICD-10-CM

## 2019-04-15 DIAGNOSIS — Z90.5 ACQUIRED ABSENCE OF KIDNEY: Chronic | ICD-10-CM

## 2019-04-15 LAB — RH IG SCN BLD-IMP: POSITIVE — SIGNIFICANT CHANGE UP

## 2019-04-15 PROCEDURE — 32666 THORACOSCOPY W/WEDGE RESECT: CPT

## 2019-04-15 PROCEDURE — 88307 TISSUE EXAM BY PATHOLOGIST: CPT | Mod: 26

## 2019-04-15 PROCEDURE — 77012 CT SCAN FOR NEEDLE BIOPSY: CPT | Mod: 26

## 2019-04-15 PROCEDURE — 71045 X-RAY EXAM CHEST 1 VIEW: CPT | Mod: 26

## 2019-04-15 PROCEDURE — 32999 UNLISTED PX LUNGS & PLEURA: CPT | Mod: 26

## 2019-04-15 RX ORDER — ATORVASTATIN CALCIUM 80 MG/1
10 TABLET, FILM COATED ORAL AT BEDTIME
Qty: 0 | Refills: 0 | Status: DISCONTINUED | OUTPATIENT
Start: 2019-04-15 | End: 2019-04-16

## 2019-04-15 RX ORDER — TAMSULOSIN HYDROCHLORIDE 0.4 MG/1
0.4 CAPSULE ORAL AT BEDTIME
Qty: 0 | Refills: 0 | Status: DISCONTINUED | OUTPATIENT
Start: 2019-04-15 | End: 2019-04-16

## 2019-04-15 RX ORDER — DOCUSATE SODIUM 100 MG
100 CAPSULE ORAL THREE TIMES A DAY
Qty: 0 | Refills: 0 | Status: DISCONTINUED | OUTPATIENT
Start: 2019-04-15 | End: 2019-04-16

## 2019-04-15 RX ORDER — NALOXONE HYDROCHLORIDE 4 MG/.1ML
0.1 SPRAY NASAL
Qty: 0 | Refills: 0 | Status: DISCONTINUED | OUTPATIENT
Start: 2019-04-15 | End: 2019-04-16

## 2019-04-15 RX ORDER — ASCORBIC ACID 60 MG
1 TABLET,CHEWABLE ORAL
Qty: 0 | Refills: 0 | COMMUNITY

## 2019-04-15 RX ORDER — MONTELUKAST 4 MG/1
10 TABLET, CHEWABLE ORAL DAILY
Qty: 0 | Refills: 0 | Status: DISCONTINUED | OUTPATIENT
Start: 2019-04-15 | End: 2019-04-16

## 2019-04-15 RX ORDER — MONTELUKAST 4 MG/1
1 TABLET, CHEWABLE ORAL
Qty: 0 | Refills: 0 | COMMUNITY

## 2019-04-15 RX ORDER — ONDANSETRON 8 MG/1
4 TABLET, FILM COATED ORAL EVERY 6 HOURS
Qty: 0 | Refills: 0 | Status: DISCONTINUED | OUTPATIENT
Start: 2019-04-15 | End: 2019-04-16

## 2019-04-15 RX ORDER — DIPHENHYDRAMINE HCL 50 MG
25 CAPSULE ORAL EVERY 4 HOURS
Qty: 0 | Refills: 0 | Status: DISCONTINUED | OUTPATIENT
Start: 2019-04-15 | End: 2019-04-16

## 2019-04-15 RX ORDER — HYDROMORPHONE HYDROCHLORIDE 2 MG/ML
0.5 INJECTION INTRAMUSCULAR; INTRAVENOUS; SUBCUTANEOUS
Qty: 0 | Refills: 0 | Status: DISCONTINUED | OUTPATIENT
Start: 2019-04-15 | End: 2019-04-15

## 2019-04-15 RX ORDER — HYDROMORPHONE HYDROCHLORIDE 2 MG/ML
1 INJECTION INTRAMUSCULAR; INTRAVENOUS; SUBCUTANEOUS
Qty: 0 | Refills: 0 | Status: DISCONTINUED | OUTPATIENT
Start: 2019-04-15 | End: 2019-04-15

## 2019-04-15 RX ORDER — GARLIC 1000 MG
0 CAPSULE ORAL
Qty: 0 | Refills: 0 | COMMUNITY

## 2019-04-15 RX ORDER — HYDROMORPHONE HYDROCHLORIDE 2 MG/ML
30 INJECTION INTRAMUSCULAR; INTRAVENOUS; SUBCUTANEOUS
Qty: 0 | Refills: 0 | Status: DISCONTINUED | OUTPATIENT
Start: 2019-04-15 | End: 2019-04-16

## 2019-04-15 RX ORDER — ONDANSETRON 8 MG/1
4 TABLET, FILM COATED ORAL ONCE
Qty: 0 | Refills: 0 | Status: COMPLETED | OUTPATIENT
Start: 2019-04-15 | End: 2019-04-15

## 2019-04-15 RX ORDER — HYDROMORPHONE HYDROCHLORIDE 2 MG/ML
0.5 INJECTION INTRAMUSCULAR; INTRAVENOUS; SUBCUTANEOUS
Qty: 0 | Refills: 0 | Status: DISCONTINUED | OUTPATIENT
Start: 2019-04-15 | End: 2019-04-16

## 2019-04-15 RX ORDER — HEPARIN SODIUM 5000 [USP'U]/ML
5000 INJECTION INTRAVENOUS; SUBCUTANEOUS ONCE
Qty: 0 | Refills: 0 | Status: COMPLETED | OUTPATIENT
Start: 2019-04-15 | End: 2019-04-15

## 2019-04-15 RX ADMIN — HEPARIN SODIUM 5000 UNIT(S): 5000 INJECTION INTRAVENOUS; SUBCUTANEOUS at 09:39

## 2019-04-15 RX ADMIN — SODIUM CHLORIDE 3 MILLILITER(S): 9 INJECTION INTRAMUSCULAR; INTRAVENOUS; SUBCUTANEOUS at 14:59

## 2019-04-15 RX ADMIN — TAMSULOSIN HYDROCHLORIDE 0.4 MILLIGRAM(S): 0.4 CAPSULE ORAL at 21:18

## 2019-04-15 RX ADMIN — HYDROMORPHONE HYDROCHLORIDE 1 MILLIGRAM(S): 2 INJECTION INTRAMUSCULAR; INTRAVENOUS; SUBCUTANEOUS at 15:59

## 2019-04-15 RX ADMIN — SODIUM CHLORIDE 30 MILLILITER(S): 9 INJECTION, SOLUTION INTRAVENOUS at 14:59

## 2019-04-15 RX ADMIN — ATORVASTATIN CALCIUM 10 MILLIGRAM(S): 80 TABLET, FILM COATED ORAL at 21:18

## 2019-04-15 RX ADMIN — Medication 100 MILLIGRAM(S): at 21:18

## 2019-04-15 RX ADMIN — Medication 100 MILLIGRAM(S): at 14:48

## 2019-04-15 RX ADMIN — SODIUM CHLORIDE 3 MILLILITER(S): 9 INJECTION INTRAMUSCULAR; INTRAVENOUS; SUBCUTANEOUS at 21:00

## 2019-04-15 RX ADMIN — HYDROMORPHONE HYDROCHLORIDE 1 MILLIGRAM(S): 2 INJECTION INTRAMUSCULAR; INTRAVENOUS; SUBCUTANEOUS at 16:15

## 2019-04-15 RX ADMIN — HYDROMORPHONE HYDROCHLORIDE 30 MILLILITER(S): 2 INJECTION INTRAMUSCULAR; INTRAVENOUS; SUBCUTANEOUS at 16:20

## 2019-04-15 RX ADMIN — HYDROMORPHONE HYDROCHLORIDE 30 MILLILITER(S): 2 INJECTION INTRAMUSCULAR; INTRAVENOUS; SUBCUTANEOUS at 21:17

## 2019-04-15 RX ADMIN — ONDANSETRON 4 MILLIGRAM(S): 8 TABLET, FILM COATED ORAL at 17:24

## 2019-04-15 NOTE — PROGRESS NOTE ADULT - SUBJECTIVE AND OBJECTIVE BOX
Subjective:  Pt is without complaints of nausea or pain,  He tolerated his diet and voided.      Vital Signs:  Vital Signs Last 24 Hrs  T(C): 36.3 (04-15-19 @ 19:52), Max: 36.6 (04-15-19 @ 07:59)  T(F): 97.3 (04-15-19 @ 19:52), Max: 97.9 (04-15-19 @ 18:00)  HR: 88 (04-15-19 @ 19:52) (65 - 100)  BP: 132/80 (04-15-19 @ 19:52) (110/43 - 141/88)  RR: 18 (04-15-19 @ 19:52) (10 - 21)  SpO2: 97% (04-15-19 @ 19:52) (95% - 100%) on (O2)    Telemetry/Alarms:  General: WN/ WD NAD  Neurology: Awake, nonfocal, CLAROS x 4  Eyes: Scleras clear, Gross vision intact  ENT: Gross hearing intact, no stridor  Neck: Neck supple, trachea midline, No JVD,   Respiratory: CTA B/L, No wheezing, rales, rhonchi; Decreased BS on the R  CV: RRR, S1S2, no murmurs  Abdominal: Soft, NT, ND +BS,   Extremities: No edema, + peripheral pulses  Skin: No Rashes, Hematoma, Ecchymosis  Lymphatic: No Neck, axilla, groin LAD  Psych: Oriented x 3, normal affect  Tube: R chest tube to suction; No AL    Relevant labs, radiology and Medications reviewed  CXR: small apical R-sided PTX    MEDICATIONS  (STANDING):  atorvastatin 10 milliGRAM(s) Oral at bedtime  docusate sodium 100 milliGRAM(s) Oral three times a day  HYDROmorphone PCA (1 mG/mL) 30 milliLiter(s) PCA Continuous PCA Continuous  lactated ringers. 1000 milliLiter(s) (30 mL/Hr) IV Continuous <Continuous>  montelukast 10 milliGRAM(s) Oral daily  multivitamin 1 Tablet(s) Oral daily  sodium chloride 0.9% lock flush 3 milliLiter(s) IV Push every 8 hours  tamsulosin 0.4 milliGRAM(s) Oral at bedtime    MEDICATIONS  (PRN):  diphenhydrAMINE   Injectable 25 milliGRAM(s) IV Push every 4 hours PRN Pruritus  HYDROmorphone PCA (1 mG/mL) Rescue Clinician Bolus 0.5 milliGRAM(s) IV Push every 15 minutes PRN for Pain Scale GREATER THAN 6  naloxone Injectable 0.1 milliGRAM(s) IV Push every 3 minutes PRN For ANY of the following changes in patient status:  A. RR LESS THAN 10 breaths per minute, B. Oxygen saturation LESS THAN 90%, C. Sedation score of 6  ondansetron Injectable 4 milliGRAM(s) IV Push every 6 hours PRN Nausea    Pertinent Physical Exam  I&O's Summary    15 Apr 2019 07:01  -  15 Apr 2019 23:48  --------------------------------------------------------  IN: 180 mL / OUT: 300 mL / NET: -120 mL    Assessment  73y Male stable s/p R VATS, RUL wedge    PLAN  Neuro: Pain management with PCA  Pulm: Encourage coughing, deep breathing and use of incentive spirometry. Daily CXR.   Cardio: Monitor telemetry/alarms  GI: Tolerating diet. Continue stool softeners.  Renal: monitor urine output, supplement electrolytes as needed  Vasc: Heparin SC/ SCDs for DVT prophylaxis  Heme: Monitor H/H  ID: Off antibiotics. Stable.  Therapy: OOB/ambulate  Tubes: Monitor Chest tube output  Disposition: Aim to D/C to home after chest tube removal; possibly tomorrow  To be discussed with Thoracic Surgery Team at AM rounds.

## 2019-04-16 ENCOUNTER — TRANSCRIPTION ENCOUNTER (OUTPATIENT)
Age: 74
End: 2019-04-16

## 2019-04-16 VITALS
SYSTOLIC BLOOD PRESSURE: 116 MMHG | OXYGEN SATURATION: 97 % | DIASTOLIC BLOOD PRESSURE: 64 MMHG | TEMPERATURE: 98 F | HEART RATE: 86 BPM | RESPIRATION RATE: 18 BRPM

## 2019-04-16 LAB
ANION GAP SERPL CALC-SCNC: 15 MMO/L — HIGH (ref 7–14)
BASOPHILS # BLD AUTO: 0.01 K/UL — SIGNIFICANT CHANGE UP (ref 0–0.2)
BASOPHILS NFR BLD AUTO: 0.1 % — SIGNIFICANT CHANGE UP (ref 0–2)
BUN SERPL-MCNC: 14 MG/DL — SIGNIFICANT CHANGE UP (ref 7–23)
CALCIUM SERPL-MCNC: 9.2 MG/DL — SIGNIFICANT CHANGE UP (ref 8.4–10.5)
CHLORIDE SERPL-SCNC: 99 MMOL/L — SIGNIFICANT CHANGE UP (ref 98–107)
CO2 SERPL-SCNC: 21 MMOL/L — LOW (ref 22–31)
CREAT SERPL-MCNC: 0.92 MG/DL — SIGNIFICANT CHANGE UP (ref 0.5–1.3)
EOSINOPHIL # BLD AUTO: 0 K/UL — SIGNIFICANT CHANGE UP (ref 0–0.5)
EOSINOPHIL NFR BLD AUTO: 0 % — SIGNIFICANT CHANGE UP (ref 0–6)
GLUCOSE SERPL-MCNC: 150 MG/DL — HIGH (ref 70–99)
HCT VFR BLD CALC: 45.3 % — SIGNIFICANT CHANGE UP (ref 39–50)
HGB BLD-MCNC: 14.8 G/DL — SIGNIFICANT CHANGE UP (ref 13–17)
IMM GRANULOCYTES NFR BLD AUTO: 0.4 % — SIGNIFICANT CHANGE UP (ref 0–1.5)
LYMPHOCYTES # BLD AUTO: 1.56 K/UL — SIGNIFICANT CHANGE UP (ref 1–3.3)
LYMPHOCYTES # BLD AUTO: 11.5 % — LOW (ref 13–44)
MCHC RBC-ENTMCNC: 30.5 PG — SIGNIFICANT CHANGE UP (ref 27–34)
MCHC RBC-ENTMCNC: 32.7 % — SIGNIFICANT CHANGE UP (ref 32–36)
MCV RBC AUTO: 93.2 FL — SIGNIFICANT CHANGE UP (ref 80–100)
MONOCYTES # BLD AUTO: 1.23 K/UL — HIGH (ref 0–0.9)
MONOCYTES NFR BLD AUTO: 9.1 % — SIGNIFICANT CHANGE UP (ref 2–14)
NEUTROPHILS # BLD AUTO: 10.73 K/UL — HIGH (ref 1.8–7.4)
NEUTROPHILS NFR BLD AUTO: 78.9 % — HIGH (ref 43–77)
NRBC # FLD: 0 K/UL — SIGNIFICANT CHANGE UP (ref 0–0)
PLATELET # BLD AUTO: 206 K/UL — SIGNIFICANT CHANGE UP (ref 150–400)
PMV BLD: 11.3 FL — SIGNIFICANT CHANGE UP (ref 7–13)
POTASSIUM SERPL-MCNC: 4.6 MMOL/L — SIGNIFICANT CHANGE UP (ref 3.5–5.3)
POTASSIUM SERPL-SCNC: 4.6 MMOL/L — SIGNIFICANT CHANGE UP (ref 3.5–5.3)
RBC # BLD: 4.86 M/UL — SIGNIFICANT CHANGE UP (ref 4.2–5.8)
RBC # FLD: 12.6 % — SIGNIFICANT CHANGE UP (ref 10.3–14.5)
SODIUM SERPL-SCNC: 135 MMOL/L — SIGNIFICANT CHANGE UP (ref 135–145)
WBC # BLD: 13.58 K/UL — HIGH (ref 3.8–10.5)
WBC # FLD AUTO: 13.58 K/UL — HIGH (ref 3.8–10.5)

## 2019-04-16 PROCEDURE — 71045 X-RAY EXAM CHEST 1 VIEW: CPT | Mod: 26

## 2019-04-16 PROCEDURE — 99238 HOSP IP/OBS DSCHRG MGMT 30/<: CPT

## 2019-04-16 RX ORDER — ACETAMINOPHEN 500 MG
650 TABLET ORAL EVERY 4 HOURS
Qty: 0 | Refills: 0 | Status: DISCONTINUED | OUTPATIENT
Start: 2019-04-16 | End: 2019-04-16

## 2019-04-16 RX ORDER — DOCUSATE SODIUM 100 MG
1 CAPSULE ORAL
Qty: 0 | Refills: 0 | DISCHARGE
Start: 2019-04-16

## 2019-04-16 RX ORDER — ACETAMINOPHEN 500 MG
2 TABLET ORAL
Qty: 0 | Refills: 0 | DISCHARGE
Start: 2019-04-16

## 2019-04-16 RX ORDER — OXYCODONE AND ACETAMINOPHEN 5; 325 MG/1; MG/1
1 TABLET ORAL EVERY 4 HOURS
Qty: 0 | Refills: 0 | Status: DISCONTINUED | OUTPATIENT
Start: 2019-04-16 | End: 2019-04-16

## 2019-04-16 RX ADMIN — Medication 100 MILLIGRAM(S): at 12:46

## 2019-04-16 RX ADMIN — HYDROMORPHONE HYDROCHLORIDE 30 MILLILITER(S): 2 INJECTION INTRAMUSCULAR; INTRAVENOUS; SUBCUTANEOUS at 07:03

## 2019-04-16 RX ADMIN — SODIUM CHLORIDE 30 MILLILITER(S): 9 INJECTION, SOLUTION INTRAVENOUS at 04:01

## 2019-04-16 RX ADMIN — Medication 100 MILLIGRAM(S): at 04:01

## 2019-04-16 RX ADMIN — SODIUM CHLORIDE 3 MILLILITER(S): 9 INJECTION INTRAMUSCULAR; INTRAVENOUS; SUBCUTANEOUS at 05:01

## 2019-04-16 RX ADMIN — MONTELUKAST 10 MILLIGRAM(S): 4 TABLET, CHEWABLE ORAL at 12:46

## 2019-04-16 RX ADMIN — SODIUM CHLORIDE 3 MILLILITER(S): 9 INJECTION INTRAMUSCULAR; INTRAVENOUS; SUBCUTANEOUS at 12:31

## 2019-04-16 RX ADMIN — Medication 1 TABLET(S): at 12:46

## 2019-04-16 NOTE — PHYSICAL THERAPY INITIAL EVALUATION ADULT - GENERAL OBSERVATIONS, REHAB EVAL
Consult received, chart reviewed. Patient received supine in bed, NAD, + R chest tube. Patient agreed to Evaluation from Physical Therapist.

## 2019-04-16 NOTE — DISCHARGE NOTE PROVIDER - NSDCFUADDAPPT_GEN_ALL_CORE_FT
See Dr Jacobson in 7 to 10 days- call fro an appointment.  Call sooner if you notice fever, pus from wound, or increasing redness. See Dr Jacobson in 7 to 10 days- call for an appointment. [phone] Call sooner if you notice fever, pus from wound, or increasing redness.

## 2019-04-16 NOTE — DISCHARGE NOTE PROVIDER - NSDCACTIVITY_GEN_ALL_CORE
Stairs allowed/Return to Work/School allowed/Walking - Indoors allowed/Walking - Outdoors allowed/Sex allowed/Showering allowed Walking - Indoors allowed/Walking - Outdoors allowed/Showering allowed/Return to Work/School allowed/Sex allowed/No heavy lifting/straining/Stairs allowed/Do not drive or operate machinery

## 2019-04-16 NOTE — PHYSICAL THERAPY INITIAL EVALUATION ADULT - ADDITIONAL COMMENTS
Pt. reports owning DME of straight cane, rolling walker.     Pt. was left sitting in chair post PT evaluation, NAD, call bell within reach, All lines/tubes intact. NAIDA Briceno made aware of pt. status

## 2019-04-16 NOTE — DISCHARGE NOTE PROVIDER - PROVIDER TOKENS
PROVIDER:[TOKEN:[[id number]]],PROVIDER:[TOKEN:[[id number]]],PROVIDER:[TOKEN:[[id number]]]

## 2019-04-16 NOTE — DISCHARGE NOTE PROVIDER - HOSPITAL COURSE
74 y/o male with h/o HLD and Renal Cancer was diagnosed with a right lung nodule 2 year PTA on a routine CXR. He has been monitored anually by his Pulmonologist and states that his right lung nodule has slowly increased in size. A bronchoscopy on 1/3/2019 was non diagnostic.  He underwent a Right Video Assisted Thoracoscopy, Right Upper Lobe Wedge Resection on 4/15/2019.  Post-op was routine and he was discharged home after chest tube removal. 72 y/o male with h/o HLD and Renal Cancer was diagnosed with a right lung nodule 2 year PTA on a routine CXR. He has been monitored anually by his Pulmonologist and states that his right lung nodule has slowly increased in size. A bronchoscopy on 1/3/2019 was non diagnostic.  He underwent a Right Video Assisted Thoracoscopy, Right Upper Lobe Wedge Resection on 4/15/2019.  Post-op was routine and he was discharged home after chest tube removal.      FU CXR after CT removed shows stable sml ptx. VATs c/d/i. Pt feels well.    Cleared for d/c to home by Dr. Jacobson    Vital Signs Last 24 Hrs    T(C): 36.7 (16 Apr 2019 11:12), Max: 36.9 (16 Apr 2019 07:51)    T(F): 98 (16 Apr 2019 11:12), Max: 98.4 (16 Apr 2019 07:51)    HR: 86 (16 Apr 2019 11:12) (65 - 100)    BP: 116/64 (16 Apr 2019 11:12) (108/63 - 142/77)    BP(mean): 108 (15 Apr 2019 18:45) (60 - 112)    RR: 18 (16 Apr 2019 11:12) (10 - 21)    SpO2: 97% (16 Apr 2019 11:12) (95% - 100%)

## 2019-04-16 NOTE — DISCHARGE NOTE PROVIDER - CARE PROVIDERS DIRECT ADDRESSES
[email or web address],DirectAddress_Unknown,DirectAddress_Unknown

## 2019-04-16 NOTE — DISCHARGE NOTE PROVIDER - NSDCCPCAREPLAN_GEN_ALL_CORE_FT
PRINCIPAL DISCHARGE DIAGNOSIS  Diagnosis: Other nonspecific abnormal finding of lung field  Assessment and Plan of Treatment:

## 2019-04-16 NOTE — DISCHARGE NOTE NURSING/CASE MANAGEMENT/SOCIAL WORK - NSDCFUADDAPPT_GEN_ALL_CORE_FT
See Dr Jacobson in 7 to 10 days- call for an appointment. [phone] Call sooner if you notice fever, pus from wound, or increasing redness.

## 2019-04-16 NOTE — DISCHARGE NOTE NURSING/CASE MANAGEMENT/SOCIAL WORK - NSDCPNINST_GEN_ALL_CORE
Keep surgical incisions clean and dry. For any signs of infection such as fever over 100.4 F, new pain that cannot be controlled with ordered medication, unusual discharge, and or chills, please call your primary care provider or visit the emergency room.

## 2019-04-16 NOTE — PROGRESS NOTE ADULT - SUBJECTIVE AND OBJECTIVE BOX
Anesthesia Pain Management Service    SUBJECTIVE: Patient is doing well with IV PCA and no significant problems reported.    Pain Scale Score	At rest: _2__ 	With Activity: ___ 	[X ] Refer to charted pain scores    THERAPY:    [ ] IV PCA Morphine		[ ] 5 mg/mL	[ ] 1 mg/mL  [X ] IV PCA Hydromorphone	[ ] 5 mg/mL	[X ] 1 mg/mL  [ ] IV PCA Fentanyl		[ ] 50 micrograms/mL    Demand dose __0.2_ lockout __6_ (minutes) Continuous Rate _0__ Total: _0.5__   mg used (in past 24 hrs)      MEDICATIONS  (STANDING):  atorvastatin 10 milliGRAM(s) Oral at bedtime  docusate sodium 100 milliGRAM(s) Oral three times a day  montelukast 10 milliGRAM(s) Oral daily  multivitamin 1 Tablet(s) Oral daily  sodium chloride 0.9% lock flush 3 milliLiter(s) IV Push every 8 hours  tamsulosin 0.4 milliGRAM(s) Oral at bedtime    MEDICATIONS  (PRN):  acetaminophen   Tablet .. 650 milliGRAM(s) Oral every 4 hours PRN Mild Pain (1 - 3)  diphenhydrAMINE   Injectable 25 milliGRAM(s) IV Push every 4 hours PRN Pruritus  naloxone Injectable 0.1 milliGRAM(s) IV Push every 3 minutes PRN For ANY of the following changes in patient status:  A. RR LESS THAN 10 breaths per minute, B. Oxygen saturation LESS THAN 90%, C. Sedation score of 6  ondansetron Injectable 4 milliGRAM(s) IV Push every 6 hours PRN Nausea  oxyCODONE    5 mG/acetaminophen 325 mG 1 Tablet(s) Oral every 4 hours PRN Moderate Pain (4 - 6)      OBJECTIVE: sitting in chair     Sedation Score:	[ X] Alert	[ ] Drowsy 	[ ] Arousable	[ ] Asleep	[ ] Unresponsive    Side Effects:	[X ] None	[ ] Nausea	[ ] Vomiting	[ ] Pruritus  		[ ] Other:    Vital Signs Last 24 Hrs  T(C): 36.9 (16 Apr 2019 07:51), Max: 36.9 (16 Apr 2019 07:51)  T(F): 98.4 (16 Apr 2019 07:51), Max: 98.4 (16 Apr 2019 07:51)  HR: 87 (16 Apr 2019 07:51) (65 - 100)  BP: 108/63 (16 Apr 2019 07:51) (108/63 - 142/77)  BP(mean): 108 (15 Apr 2019 18:45) (60 - 112)  RR: 18 (16 Apr 2019 07:51) (10 - 21)  SpO2: 96% (16 Apr 2019 07:51) (95% - 100%)    ASSESSMENT/ PLAN    Therapy to  be:	[ ] Continue   [ X] Discontinued   [X ] Change to prn Analgesics    Documentation and Verification of current medications:   [X] Done	[ ] Not done, not elligible    Comments: PRN Oral/IV opioids and/or Adjuvant medication to be ordered at this point.

## 2019-04-16 NOTE — DISCHARGE NOTE PROVIDER - CARE PROVIDER_API CALL
Ambrose Jacobson)  Thoracic Surgery  [address] [place] [address]  Phone: [phone]  Fax: [phone]  Follow Up Time:     Laith Crisostomo)  Gastroenterology; Internal Medicine  [address] [facility], [address]  [place] [address]  Phone: [phone]  Fax: [phone]  Follow Up Time:     Pedro Pablo Soni)  Cardiology; Internal Medicine  [address] [facility], [address]  [place] [address]  Phone: [phone]  Fax: [phone]  Follow Up Time:

## 2019-04-16 NOTE — DISCHARGE NOTE PROVIDER - NSDCFUADDINST_GEN_ALL_CORE_FT
Keep the wound clean with soap and water and allow to air dry Keep the wound clean with soap and water and allow to air dry. Keep dressing dry until Thursday, then remove dressing and shower, you may cover stitch with a band aid if desired Keep the wound clean with soap and water and allow to air dry. Keep dressing dry until Wednesday then remove dressing and shower, you may cover stitch with a band aid if desired  Suture will be removed in office.  Continue to use incentive spirometer.  Walk 4-5 x per day. Increase as tolerated. YOu may climb stairs.

## 2019-04-16 NOTE — DISCHARGE NOTE NURSING/CASE MANAGEMENT/SOCIAL WORK - NSDCDPATPORTLINK_GEN_ALL_CORE
You can access the [facility] Portal, offered by [facility], by registering with the following website: [email or web address]

## 2019-04-16 NOTE — PHYSICAL THERAPY INITIAL EVALUATION ADULT - PERTINENT HX OF CURRENT PROBLEM, REHAB EVAL
Pt. admitted for R lung increasing opacity . Pt. is s/p R VATS, RUL wedge. PMHx of  HLD and Renal Cancer s/p right nephrectomy in 1991 .

## 2019-04-16 NOTE — DISCHARGE NOTE NURSING/CASE MANAGEMENT/SOCIAL WORK - NSDPDISTO_GEN_ALL_CORE
Patient is AXOX4. Denied chest pain and shortness of breath. Vital signs remained stable. Pain was assessed and remained at an acceptable level with interventions. Incentive spirometer encouraged. Patient ambulated in hallway. Patient safety maintained through out shift. Surgical incision is d/c/i. Chest tube was removed. PCA pump was D/C. IVs are being removed and patient is being discharged./Home

## 2019-04-16 NOTE — DISCHARGE NOTE NURSING/CASE MANAGEMENT/SOCIAL WORK - NSDCVIVACCINE_GEN_ALL_CORE_FT
Consult dictated. Transient reproducible visual scotoma in both eyes. Do not suspect TIA/Amaurosis Fugax. Reasonable to continue ASA.  If MRI neg DC home  Matthew Maher MD No Vaccines Administered.

## 2019-04-16 NOTE — PHYSICAL THERAPY INITIAL EVALUATION ADULT - CRITERIA FOR SKILLED THERAPEUTIC INTERVENTIONS
rehab potential/impairments found/therapy frequency/predicted duration of therapy intervention/anticipated discharge recommendation

## 2019-04-18 LAB — SURGICAL PATHOLOGY STUDY: SIGNIFICANT CHANGE UP

## 2019-05-01 ENCOUNTER — APPOINTMENT (OUTPATIENT)
Dept: THORACIC SURGERY | Facility: CLINIC | Age: 74
End: 2019-05-01
Payer: MEDICARE

## 2019-05-01 VITALS
WEIGHT: 176 LBS | RESPIRATION RATE: 16 BRPM | HEART RATE: 69 BPM | TEMPERATURE: 98.3 F | HEIGHT: 69 IN | OXYGEN SATURATION: 97 % | DIASTOLIC BLOOD PRESSURE: 79 MMHG | BODY MASS INDEX: 26.07 KG/M2 | SYSTOLIC BLOOD PRESSURE: 121 MMHG

## 2019-05-01 PROCEDURE — 99024 POSTOP FOLLOW-UP VISIT: CPT

## 2019-09-04 ENCOUNTER — APPOINTMENT (OUTPATIENT)
Dept: THORACIC SURGERY | Facility: CLINIC | Age: 74
End: 2019-09-04
Payer: MEDICARE

## 2019-09-04 VITALS
SYSTOLIC BLOOD PRESSURE: 135 MMHG | DIASTOLIC BLOOD PRESSURE: 71 MMHG | WEIGHT: 173 LBS | TEMPERATURE: 97.9 F | RESPIRATION RATE: 16 BRPM | HEIGHT: 69 IN | BODY MASS INDEX: 25.62 KG/M2 | HEART RATE: 87 BPM | OXYGEN SATURATION: 96 %

## 2019-09-04 PROCEDURE — 99213 OFFICE O/P EST LOW 20 MIN: CPT

## 2019-09-04 NOTE — PHYSICAL EXAM
[Sclera] : the sclera and conjunctiva were normal [] : the neck was supple [Neck Appearance] : the appearance of the neck was normal [Respiration, Rhythm And Depth] : normal respiratory rhythm and effort [Neck Cervical Mass (___cm)] : no neck mass was observed [Auscultation Breath Sounds / Voice Sounds] : lungs were clear to auscultation bilaterally [Heart Rate And Rhythm] : heart rate was normal and rhythm regular [Heart Sounds] : normal S1 and S2 [Abdomen Soft] : soft [Examination Of The Chest] : the chest was normal in appearance [Cervical Lymph Nodes Enlarged Posterior Bilaterally] : posterior cervical [Abdomen Tenderness] : non-tender [Supraclavicular Lymph Nodes Enlarged Bilaterally] : supraclavicular [Cervical Lymph Nodes Enlarged Anterior Bilaterally] : anterior cervical [Abnormal Walk] : normal gait [No CVA Tenderness] : no ~M costovertebral angle tenderness [Skin Color & Pigmentation] : normal skin color and pigmentation [No Focal Deficits] : no focal deficits [Impaired Insight] : insight and judgment were intact [Oriented To Time, Place, And Person] : oriented to person, place, and time [Affect] : the affect was normal

## 2019-09-06 NOTE — REVIEW OF SYSTEMS
[SOB on Exertion] : shortness of breath during exertion [Negative] : Heme/Lymph [Wheezing] : no wheezing [Cough] : no cough [Orthopnea] : no orthopnea [PND] : no PND

## 2019-09-06 NOTE — HISTORY OF PRESENT ILLNESS
[FreeTextEntry1] : Mr. Jordon Raman is a 72 y/o male who presents today for postop evaluation S/P bronchoscopy, right VATS, wedge resection of RUL on 4/15/19. Pathology revealed T1MiNx minimally invasive adenocarcinoma. \par \par He has history of HLD, kidney cancer s/p right nephrectomy in 1991 at [facility]. He was followed by his pulmonologist for an enlarging right lung nodule. 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 He admits to shortness of breath on exertion, but denies any fever, chills, cough, chest pain, hemoptysis, or recent illness. \par \par

## 2019-09-06 NOTE — ASSESSMENT
[FreeTextEntry1] : Mr. Jordon Raman is a 74 y/o male who presents today for postop evaluation S/P bronchoscopy, right VATS, wedge resection of RUL on 4/15/19. Pathology revealed T1MiNx minimally invasive adenocarcinoma. \par \par He has history of HLD, kidney cancer s/p right nephrectomy in 1991 at [facility]. He was followed by his pulmonologist for an enlarging right lung nodule. 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  follow up in 6 months with Chest CT Scan. \par \par Written by Karla Ziegler NP, acting as a scribe for Rebekah Hickey MD.\par \par The documentation recorded by the scribe accurately reflects the service I personally performed and the decisions made by me. REBEKAH HICKEY MD\par

## 2019-09-06 NOTE — CONSULT LETTER
[Dear  ___] : Dear  [unfilled], [Please see my note below.] : Please see my note below. [Courtesy Letter:] : I had the pleasure of seeing your patient, [unfilled], in my office today. [Sincerely,] : Sincerely, [FreeTextEntry2] : Dr. Arsalan Giles (Pulm/Ref)\par Dr. Laith Crisostomo (PCP)\par Dr. Soni (Card)  [FreeTextEntry3] : \par \par \par Ambrose Jacobson MD, FACS \par , Division of Thoracic Surgery \par [facility] \par Chief, Thoracic Surgery \par [facility] \par Department of Cardiovascular & Thoracic Surgery \par  \par [facility] [employer] [facility]

## 2019-12-17 ENCOUNTER — TRANSCRIPTION ENCOUNTER (OUTPATIENT)
Age: 74
End: 2019-12-17

## 2020-02-13 ENCOUNTER — APPOINTMENT (OUTPATIENT)
Dept: UROLOGY | Facility: CLINIC | Age: 75
End: 2020-02-13
Payer: MEDICARE

## 2020-02-13 PROCEDURE — 99214 OFFICE O/P EST MOD 30 MIN: CPT

## 2020-02-13 NOTE — PHYSICAL EXAM
[General Appearance - Well Developed] : well developed [General Appearance - Well Nourished] : well nourished [Normal Appearance] : normal appearance [Well Groomed] : well groomed [General Appearance - In No Acute Distress] : no acute distress [Abdomen Soft] : soft [Abdomen Tenderness] : non-tender [Costovertebral Angle Tenderness] : no ~M costovertebral angle tenderness [Urethral Meatus] : meatus normal [Urinary Bladder Findings] : the bladder was normal on palpation [Scrotum] : the scrotum was normal [Testes Mass (___cm)] : there were no testicular masses [No Prostate Nodules] : no prostate nodules [Edema] : no peripheral edema [] : no respiratory distress [Respiration, Rhythm And Depth] : normal respiratory rhythm and effort [Oriented To Time, Place, And Person] : oriented to person, place, and time [Exaggerated Use Of Accessory Muscles For Inspiration] : no accessory muscle use [Affect] : the affect was normal [Mood] : the mood was normal [Not Anxious] : not anxious [Normal Station and Gait] : the gait and station were normal for the patient's age [No Focal Deficits] : no focal deficits [No Palpable Adenopathy] : no palpable adenopathy

## 2020-02-14 LAB
APPEARANCE: CLEAR
BACTERIA: NEGATIVE
BILIRUBIN URINE: NEGATIVE
BLOOD URINE: NEGATIVE
COLOR: NORMAL
GLUCOSE QUALITATIVE U: NEGATIVE
HYALINE CASTS: 0 /LPF
KETONES URINE: NEGATIVE
LEUKOCYTE ESTERASE URINE: NEGATIVE
MICROSCOPIC-UA: NORMAL
NITRITE URINE: NEGATIVE
PH URINE: 7.5
PROTEIN URINE: NEGATIVE
PSA FREE FLD-MCNC: 27 %
PSA FREE SERPL-MCNC: 0.71 NG/ML
PSA SERPL-MCNC: 2.61 NG/ML
RED BLOOD CELLS URINE: 0 /HPF
SPECIFIC GRAVITY URINE: 1.01
SQUAMOUS EPITHELIAL CELLS: 0 /HPF
UROBILINOGEN URINE: NORMAL
WHITE BLOOD CELLS URINE: 0 /HPF

## 2020-02-27 ENCOUNTER — TRANSCRIPTION ENCOUNTER (OUTPATIENT)
Age: 75
End: 2020-02-27

## 2020-03-11 ENCOUNTER — APPOINTMENT (OUTPATIENT)
Dept: THORACIC SURGERY | Facility: CLINIC | Age: 75
End: 2020-03-11

## 2020-05-04 NOTE — H&P PST ADULT - DOES PATIENT HAVE ADVANCE DIRECTIVE
patient was on senna s/p constipation for days, sacral decub possible wound vac placement and to keep wound clean HCP addressed/No

## 2021-01-08 NOTE — ASU DISCHARGE PLAN (ADULT/PEDIATRIC). - ASU FOLLOWUP
Hpi Title: Evaluation of Skin Lesions How Severe Are Your Spot(S)?: mild Have Your Spot(S) Been Treated In The Past?: has not been treated Family Member: Mother LIJ ASU (Adult): ROSALINDA MAYEN (Adult):/PACU [phone]

## 2021-02-04 ENCOUNTER — APPOINTMENT (OUTPATIENT)
Dept: UROLOGY | Facility: CLINIC | Age: 76
End: 2021-02-04
Payer: MEDICARE

## 2021-02-04 PROCEDURE — 99213 OFFICE O/P EST LOW 20 MIN: CPT

## 2021-02-05 LAB
APPEARANCE: CLEAR
BACTERIA: NEGATIVE
BILIRUBIN URINE: NEGATIVE
BLOOD URINE: NEGATIVE
COLOR: COLORLESS
GLUCOSE QUALITATIVE U: NEGATIVE
HYALINE CASTS: 0 /LPF
KETONES URINE: NEGATIVE
LEUKOCYTE ESTERASE URINE: NEGATIVE
MICROSCOPIC-UA: NORMAL
NITRITE URINE: NEGATIVE
PH URINE: 6.5
PROTEIN URINE: NEGATIVE
PSA FREE FLD-MCNC: 30 %
PSA FREE SERPL-MCNC: 0.92 NG/ML
PSA SERPL-MCNC: 3.01 NG/ML
RED BLOOD CELLS URINE: 0 /HPF
SPECIFIC GRAVITY URINE: 1.01
SQUAMOUS EPITHELIAL CELLS: 0 /HPF
UROBILINOGEN URINE: NORMAL
WHITE BLOOD CELLS URINE: 0 /HPF

## 2021-02-18 ENCOUNTER — TRANSCRIPTION ENCOUNTER (OUTPATIENT)
Age: 76
End: 2021-02-18

## 2021-03-03 ENCOUNTER — NON-APPOINTMENT (OUTPATIENT)
Age: 76
End: 2021-03-03

## 2021-03-03 ENCOUNTER — APPOINTMENT (OUTPATIENT)
Dept: THORACIC SURGERY | Facility: CLINIC | Age: 76
End: 2021-03-03
Payer: MEDICARE

## 2021-03-03 VITALS
RESPIRATION RATE: 17 BRPM | DIASTOLIC BLOOD PRESSURE: 85 MMHG | BODY MASS INDEX: 25.92 KG/M2 | HEART RATE: 78 BPM | TEMPERATURE: 98.5 F | OXYGEN SATURATION: 78 % | SYSTOLIC BLOOD PRESSURE: 135 MMHG | HEIGHT: 69 IN | WEIGHT: 175 LBS

## 2021-03-03 PROCEDURE — 99213 OFFICE O/P EST LOW 20 MIN: CPT

## 2021-03-03 NOTE — PHYSICAL EXAM
[Neck Appearance] : the appearance of the neck was normal [Respiration, Rhythm And Depth] : normal respiratory rhythm and effort [Exaggerated Use Of Accessory Muscles For Inspiration] : no accessory muscle use [Auscultation Breath Sounds / Voice Sounds] : lungs were clear to auscultation bilaterally [Heart Rate And Rhythm] : heart rate was normal and rhythm regular [Examination Of The Chest] : the chest was normal in appearance [Chest Visual Inspection Thoracic Asymmetry] : no chest asymmetry [Diminished Respiratory Excursion] : normal chest expansion [Abdomen Soft] : soft [Cervical Lymph Nodes Enlarged Posterior Bilaterally] : posterior cervical [Cervical Lymph Nodes Enlarged Anterior Bilaterally] : anterior cervical [Supraclavicular Lymph Nodes Enlarged Bilaterally] : supraclavicular [No CVA Tenderness] : no ~M costovertebral angle tenderness [Abnormal Walk] : normal gait [Nail Clubbing] : no clubbing  or cyanosis of the fingernails [Musculoskeletal - Swelling] : no joint swelling seen [Skin Color & Pigmentation] : normal skin color and pigmentation [Skin Turgor] : normal skin turgor [] : no rash [Skin Lesions] : no skin lesions [Deep Tendon Reflexes (DTR)] : deep tendon reflexes were 2+ and symmetric [Sensation] : the sensory exam was normal to light touch and pinprick [Motor Exam] : the motor exam was normal [No Focal Deficits] : no focal deficits [Oriented To Time, Place, And Person] : oriented to person, place, and time [Impaired Insight] : insight and judgment were intact [Affect] : the affect was normal [Mood] : the mood was normal [Memory Recent] : recent memory was not impaired [Memory Remote] : remote memory was not impaired

## 2021-03-04 NOTE — HISTORY OF PRESENT ILLNESS
[FreeTextEntry1] : Mr. Jordon Raman is a 76 y/o male who presents today for postop evaluation S/P bronchoscopy, right VATS, wedge resection of RUL on 4/15/19. Pathology revealed T1MiNx minimally invasive adenocarcinoma. \par \par He has history of HLD, kidney cancer s/p right nephrectomy in 1991 at [facility]. He was followed by his pulmonologist for an enlarging right lung nodule. 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\par - no new or suspicious nodules\par - no effusion\par \par CT Chest on 2/24/2020:\par - post op changes s/p RUL wedge resection\par - bilateral apical scarring, linear atelectasis/scarring in the lingula\par - multiple bilateral calcified and noncalcified pleural plaques, w/o suspicious nodule or infiltrate\par \par CT Chest w/o contrast on 2/23/2021:\par - Stable postsurgical changed from RUL wedge resection\par - No new evidence of disease recurrence \par - Scattered pleural plaques and calcifications, likely sequela of asbestos exposure\par - Stable hepatic hypodensity\par \par Patient was last seen 9/4/2019. He is here today for follow up. Today, patient denies worsening SOB, chest pain, cough, hemoptysis, fever, chills, night sweats, lightheadedness or dizziness.\par

## 2021-03-04 NOTE — DATA REVIEWED
[FreeTextEntry1] : CT Chest on 2/24/2020:\par - post op changes s/p RUL wedge resection\par - bilateral apical scarring, linear atelectasis/scarring in the lingula\par - multiple bilateral calcified and noncalcified pleural plaques, w/o suspicious nodule or infiltrate\par \par CT Chest w/o contrast on 2/23/2021:\par - Stable postsurgical changed from RUL wedge resection\par - No new evidence of disease recurrence \par - Scattered pleural plaques and calcifications, likely sequela of asbestos exposure\par - Stable hepatic hypodensity

## 2021-03-04 NOTE — CONSULT LETTER
[Dear  ___] : Dear  [unfilled], [( Thank you for referring [unfilled] for consultation for _____ )] : Thank you for referring [unfilled] for consultation for [unfilled] [Please see my note below.] : Please see my note below. [Consult Closing:] : Thank you very much for allowing me to participate in the care of this patient.  If you have any questions, please do not hesitate to contact me. [Sincerely,] : Sincerely, [Courtesy Letter:] : I had the pleasure of seeing your patient, [unfilled], in my office today. [FreeTextEntry2] : Dr. Arsalan Charlton (Pulm/Ref)\par Dr. Laith Crisostomo (PCP)\par Dr. Soni (Card)  [FreeTextEntry3] : Ambrose Jacobson MD, FACS \par , Division of Thoracic Surgery \par [facility] \par Chief, Thoracic Surgery \par [facility] \par Department of Cardiovascular & Thoracic Surgery \par  \par [facility]\par

## 2021-03-04 NOTE — ASSESSMENT
[FreeTextEntry1] : Mr. Jordon Raman is a 76 y/o male who presents today for postop evaluation S/P bronchoscopy, right VATS, wedge resection of RUL on 4/15/19. Pathology revealed T1MiNx minimally invasive adenocarcinoma. \par \par I have reviewed the medical records and imaging with the patient at the time of this office consultation; CT scan showed no evidence of recurrence, recommended patient to return to office in 1 year with surveillance CT, chest, no contrast. \par \par I personally performed the services described in the documentation, reviewed the documentation recorded by the scribe in my presence and it accurately and completely records my words and actions.\par \par I, SAMANTHA Moreno-C, am scribing for and the presence of REBEKAH Meza, the following sections HISTORY OF PRESENT ILLNESS, PAST MEDICAL/FAMILY/SOCIAL HISTORY; REVIEW OF SYSTEMS; VITAL SIGNS; PHYSICAL EXAM; DISPOSITION.\par \par \par \par \par

## 2021-03-19 NOTE — ASU PATIENT PROFILE, ADULT - HEALTHCARE QUESTIONS, PROFILE
Please call pt: She should continue with psychiatry supplying that. I will change the prescription in the computer. Please clarify if she is taking 75mg BID or 150mg at night. none
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
## 2021-11-29 ENCOUNTER — APPOINTMENT (OUTPATIENT)
Dept: ORTHOPEDIC SURGERY | Facility: CLINIC | Age: 76
End: 2021-11-29
Payer: MEDICARE

## 2021-11-29 ENCOUNTER — NON-APPOINTMENT (OUTPATIENT)
Age: 76
End: 2021-11-29

## 2021-11-29 VITALS — DIASTOLIC BLOOD PRESSURE: 71 MMHG | SYSTOLIC BLOOD PRESSURE: 150 MMHG | HEART RATE: 88 BPM

## 2021-11-29 VITALS — BODY MASS INDEX: 25.76 KG/M2 | WEIGHT: 170 LBS | HEIGHT: 68 IN

## 2021-11-29 DIAGNOSIS — S46.211A STRAIN OF MUSCLE, FASCIA AND TENDON OF OTHER PARTS OF BICEPS, RIGHT ARM, INITIAL ENCOUNTER: ICD-10-CM

## 2021-11-29 PROCEDURE — 73030 X-RAY EXAM OF SHOULDER: CPT | Mod: RT

## 2021-11-29 PROCEDURE — 99203 OFFICE O/P NEW LOW 30 MIN: CPT

## 2021-11-29 NOTE — HISTORY OF PRESENT ILLNESS
[Stable] : stable [7] : a current pain level of 7/10 [Direct Pressure] : worsened by direct pressure [Lifting] : worsened by lifting [Rest] : relieved by rest [de-identified] : 76 year old RHD male, semi retired  presents with Right shoulder pain and biceps deformity for the past 3 days. He was lifting a heavy snowblower at his home and felt a painful pop in the shoulder. He was having some low level anterior shoulder pain for 1 week prior to the incident. He developed bruising and swelling over the following days down by his elbow.  \par \par He denies HTN, DM or smoking

## 2021-11-29 NOTE — DISCUSSION/SUMMARY
[de-identified] : Discussion had with the patient. Clinical findings consistent with a right proximal biceps tendon rupture. Advised on avoidance of any exertional activities using the right shoulder and arm for the next 6 weeks. Swelling/ecchymosis will subside.  Explained to patient that he will be left with a Kp deformity of the right arm.  Continue to monitor clinically with followup in 6 weeks.

## 2021-11-29 NOTE — PHYSICAL EXAM
[Normal] : Gait: normal [UE] : Sensory: Intact in bilateral upper extremities [Rad] : radial 2+ and symmetric bilaterally [de-identified] : Right upper extremity: Swelling/ecchymosis right arm biceps with Kp deformity. No tenderness over the right shoulder. Right shoulder active range of motion to 130° forward elevation, 90° abduction, internal rotation hand to lower lumbar region. Intact belly press test able to externally rotate the right shoulder and arm against manual resistance. Elevation strength 4/5. Left shoulder: Skin intact. Normal appearance left shoulder and arm. No tenderness. Active motion left shoulder normal. Intact foot elevation, abduction and rotation strength with manual muscle testing. [de-identified] : X-rays right shoulder AP, scapular Y. and axillary views taken in the office today: No significant arthritic changes. No fractures. No loose bodies.

## 2022-01-10 ENCOUNTER — APPOINTMENT (OUTPATIENT)
Dept: ORTHOPEDIC SURGERY | Facility: CLINIC | Age: 77
End: 2022-01-10
Payer: MEDICARE

## 2022-01-10 VITALS — HEIGHT: 68 IN | WEIGHT: 170 LBS | BODY MASS INDEX: 25.76 KG/M2

## 2022-01-10 DIAGNOSIS — S46.211D STRAIN OF MUSCLE, FASCIA AND TENDON OF OTHER PARTS OF BICEPS, RIGHT ARM, SUBSEQUENT ENCOUNTER: ICD-10-CM

## 2022-01-10 PROCEDURE — 99213 OFFICE O/P EST LOW 20 MIN: CPT

## 2022-02-03 ENCOUNTER — APPOINTMENT (OUTPATIENT)
Dept: UROLOGY | Facility: CLINIC | Age: 77
End: 2022-02-03
Payer: MEDICARE

## 2022-02-03 DIAGNOSIS — C64.9 MALIGNANT NEOPLASM OF UNSPECIFIED KIDNEY, EXCEPT RENAL PELVIS: ICD-10-CM

## 2022-02-03 DIAGNOSIS — R35.0 FREQUENCY OF MICTURITION: ICD-10-CM

## 2022-02-03 PROCEDURE — 99214 OFFICE O/P EST MOD 30 MIN: CPT

## 2022-02-04 LAB
APPEARANCE: CLEAR
BACTERIA: NEGATIVE
BILIRUBIN URINE: NEGATIVE
BLOOD URINE: NEGATIVE
COLOR: NORMAL
GLUCOSE QUALITATIVE U: NEGATIVE
HYALINE CASTS: 0 /LPF
KETONES URINE: NEGATIVE
LEUKOCYTE ESTERASE URINE: NEGATIVE
MICROSCOPIC-UA: NORMAL
NITRITE URINE: NEGATIVE
PH URINE: 6.5
PROTEIN URINE: NEGATIVE
PSA FREE FLD-MCNC: 28 %
PSA FREE SERPL-MCNC: 0.8 NG/ML
PSA SERPL-MCNC: 2.85 NG/ML
RED BLOOD CELLS URINE: 0 /HPF
SPECIFIC GRAVITY URINE: 1
SQUAMOUS EPITHELIAL CELLS: 0 /HPF
UROBILINOGEN URINE: NORMAL
WHITE BLOOD CELLS URINE: 0 /HPF

## 2022-02-24 ENCOUNTER — OUTPATIENT (OUTPATIENT)
Dept: OUTPATIENT SERVICES | Facility: HOSPITAL | Age: 77
LOS: 1 days | End: 2022-02-24
Payer: MEDICARE

## 2022-02-24 ENCOUNTER — APPOINTMENT (OUTPATIENT)
Dept: CT IMAGING | Facility: CLINIC | Age: 77
End: 2022-02-24
Payer: MEDICARE

## 2022-02-24 DIAGNOSIS — C34.90 MALIGNANT NEOPLASM OF UNSPECIFIED PART OF UNSPECIFIED BRONCHUS OR LUNG: ICD-10-CM

## 2022-02-24 DIAGNOSIS — Z98.890 OTHER SPECIFIED POSTPROCEDURAL STATES: Chronic | ICD-10-CM

## 2022-02-24 DIAGNOSIS — Z90.5 ACQUIRED ABSENCE OF KIDNEY: Chronic | ICD-10-CM

## 2022-02-24 PROCEDURE — G1004: CPT

## 2022-02-24 PROCEDURE — 71250 CT THORAX DX C-: CPT | Mod: ME

## 2022-02-24 PROCEDURE — 71250 CT THORAX DX C-: CPT | Mod: 26,ME

## 2022-03-02 ENCOUNTER — APPOINTMENT (OUTPATIENT)
Dept: THORACIC SURGERY | Facility: CLINIC | Age: 77
End: 2022-03-02
Payer: MEDICARE

## 2022-03-02 VITALS
WEIGHT: 175 LBS | BODY MASS INDEX: 25.92 KG/M2 | SYSTOLIC BLOOD PRESSURE: 136 MMHG | HEART RATE: 89 BPM | TEMPERATURE: 97.3 F | HEIGHT: 69 IN | OXYGEN SATURATION: 98 % | DIASTOLIC BLOOD PRESSURE: 78 MMHG

## 2022-03-02 PROCEDURE — 99213 OFFICE O/P EST LOW 20 MIN: CPT

## 2022-03-03 NOTE — CONSULT LETTER
[Dear  ___] : Dear  [unfilled], [Courtesy Letter:] : I had the pleasure of seeing your patient, [unfilled], in my office today. [( Thank you for referring [unfilled] for consultation for _____ )] : Thank you for referring [unfilled] for consultation for [unfilled] [Please see my note below.] : Please see my note below. [Consult Closing:] : Thank you very much for allowing me to participate in the care of this patient.  If you have any questions, please do not hesitate to contact me. [Sincerely,] : Sincerely, [FreeTextEntry2] : Dr. Arsalan Charlton (Pulm/Ref)\par Dr. Laith Crisostomo (PCP)\par Dr. Soni (Card)  [FreeTextEntry3] : Ambrose Jacobson MD, FACS \par , Division of Thoracic Surgery \par [facility] \par Chief, Thoracic Surgery \par [facility] \par Department of Cardiovascular & Thoracic Surgery \par  \par [facility]\par

## 2022-03-03 NOTE — HISTORY OF PRESENT ILLNESS
[FreeTextEntry1] : Mr. Jordon Raman is a 75 y/o male who presents today for postop evaluation S/P bronchoscopy, right VATS, wedge resection of RUL on 4/15/19. Pathology revealed T1MiNx minimally invasive adenocarcinoma. \par \par He has history of HLD, kidney cancer s/p right nephrectomy in 1991 at [facility]. He was followed by his pulmonologist for an enlarging right lung nodule. \par \par CT Chest w/o contrast on 2/23/2021:\par - Stable postsurgical changed from RUL wedge resection\par - No new evidence of disease recurrence \par - Scattered pleural plaques and calcifications, likely sequela of asbestos exposure\par - Stable hepatic hypodensity\par \par CT Chest on 2/24/22:\par - Stable appearance of the chest with postsurgical changes in the right upper lobe and asbestos-related pleural disease\par \par Patient is here today for 1 year follow up. Patient denies worsening shortness of breath, cough, chest pain, fever, chills, unintentional weight loss, hemoptysis. Endorses very mild intermittent cough. \par \par

## 2022-03-03 NOTE — ASSESSMENT
[FreeTextEntry1] : Mr. Jordon Raman is a 77 y/o male who presents today for postop evaluation S/P bronchoscopy, right VATS, wedge resection of RUL on 4/15/19. Pathology revealed T1MiNx minimally invasive adenocarcinoma. \par \par I have independently reviewed the patient's medical records and diagnostic images at time of this office visit. CT Chest stable, HEATHER. I recommend he return to office in 1 year with repeat CT Chest w/o contrast. \par \par I personally performed the services described in the documentation, reviewed the documentation recorded by the scribe in my presence and it accurately and completely records my words and actions.\par \par I, HUNTER Mclaughlin, am scribing for and in the presence of REBEKAH Meza, the following sections HISTORY OF PRESENT ILLNESS, PAST MEDICAL/FAMILY/SOCIAL HISTORY; REVIEW OF SYSTEMS; VITAL SIGNS; PHYSICAL EXAM; DISPOSITION.\par  \par \par \par \par \par \par \par

## 2022-06-22 NOTE — H&P PST ADULT - NEGATIVE PSYCHIATRIC SYMPTOMS
Pt requested a phone call.    - She wanted to share recent wt loss goal: 189.6 on 6/15/22.  - New wt loss >>> has her feeling confident with wearing shorts = helpful during warm summer days.     - Pt requested HL Support Info emailed. No phi in email.     [facility] Healthy Lifestyle Virtual Support Group    This group is held monthly on a Friday on the fourth Friday from 12:30 PM - to 1:30 PM. It is 60 minutes of small group guided discussion, support and resources led by Health , Aminah Cruz.     All are welcome who want a healthy lifestyle. To receive monthly invites to the Healthy Lifestyle Virtual Support Group or if you have questions about the support group, please email Aminah at [email or web address].      Aminah will send out invites for each session, with the phone number and the conference ID number specific to that session.      Verna Munroe, NBC-HWC, FMCHC, CHES, CPT   National Board Certified Health and   Weight Management scheduling team: [phone]. Team is available weekdays from 7 am to 5 pm.   no insomnia/no anxiety/no depression/no suicidal ideation

## 2022-09-21 NOTE — H&P PST ADULT - VENOUS THROMBOEMBOLISM BMI
A user error has taken place: encounter opened in error, closed for administrative reasons.             (1) obesity (BMI greater than 25)

## 2022-10-18 ENCOUNTER — NON-APPOINTMENT (OUTPATIENT)
Age: 77
End: 2022-10-18

## 2022-10-21 ENCOUNTER — APPOINTMENT (OUTPATIENT)
Dept: OTOLARYNGOLOGY | Facility: CLINIC | Age: 77
End: 2022-10-21

## 2022-10-21 VITALS
HEIGHT: 69 IN | BODY MASS INDEX: 25.18 KG/M2 | WEIGHT: 170 LBS | SYSTOLIC BLOOD PRESSURE: 128 MMHG | DIASTOLIC BLOOD PRESSURE: 78 MMHG | HEART RATE: 80 BPM

## 2022-10-21 PROCEDURE — 92557 COMPREHENSIVE HEARING TEST: CPT

## 2022-10-21 PROCEDURE — 99203 OFFICE O/P NEW LOW 30 MIN: CPT | Mod: 25

## 2022-10-21 PROCEDURE — G0268 REMOVAL OF IMPACTED WAX MD: CPT

## 2022-10-21 PROCEDURE — 92567 TYMPANOMETRY: CPT

## 2022-10-28 NOTE — DATA REVIEWED
[de-identified] : An audiogram was ordered and performed including pure tones, tympanometry and speech testing for the patients complaint of hearing loss\par I have independently reviewed the patient's audiogram from today and my findings include \par AU Normal to moderate SNHL 250-8k hz. AU Tymp A. Tinnitus Match 45sb @ 8k hz

## 2022-10-28 NOTE — REVIEW OF SYSTEMS
[Ear Itch] : ear itch [Negative] : Heme/Lymph [As Noted in HPI] : as noted in HPI [Ear Noises] : ear noises

## 2022-10-28 NOTE — PHYSICAL EXAM
[Binocular Microscopic Exam] : Binocular microscopic exam was performed [Hearing Loss Right Only] : normal [Hearing Loss Left Only] : normal [FreeTextEntry9] : cerumen impaction. removed [Normal] : mucosa is normal [Midline] : trachea located in midline position

## 2022-10-28 NOTE — HISTORY OF PRESENT ILLNESS
[de-identified] : 77Y present for bilateral tinnitus for past 4 years. \par Sound is similar to Radio static\par Sound is constant worse in quiet rooms\par Bilateral itchy for 2 weeks\par Hearing loss was gradual  \par There right ear is better\par Family history of hearing loss\par Denies history of otalgia, otorrhea, ear infections, tinnitus, dizziness, vertigo, ear surgeries. \par No history of head/otologic trauma, no chemo therapy, IV antibiotics or ototoxins. 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

## 2022-10-28 NOTE — ASSESSMENT
[FreeTextEntry1] : 77Y M with bilateral tinnitus 2/2 bilateral SNHL and left cerumen impaction. Cerumen removal. Patient tolerated well. Physical exam shows normal EAC and TM.\par \par Reviewed Personally Audiogram AU Normal to moderate SNHL 250-8k hz. AU Tymp A. Tinnitus Match 45sb @ 8k hz\par \par Recommend:\par Tinnitus \par -Discussed that Hearing Aids may help with relieving some of the tinnitus. Tinnitus usually follows the same pattern of hearing loss and can be attributed to phantom sounds in the brain filling in for the loss of hearing in those particular frequencies\par -Discussed that Tinnitus usually can be attributed to phantom sounds in the brain filling in for sounds. If the tinnitus is brief only last for a few seconds with no loss of hearing associated. It is usually physiological and can be management conservatively \par -Discussed notched therapy, masking therapy, cognitive behavioral therapy, factors that may influence tinnitus, and discussed limited benefit of tinnitus supplements.\par -Patient states will try white noise machine\par \par SNHL\par -Discussed Benefit of Hearings Aids and their value of helping keep brain stimulated by helping hear conversation which keeps a person active and socially connected. Stressed also the association with a lower risk of incident dementia and slower cognitive decline.\par -Clearance Hearing Aid Evaluation Given\par \par Cerumen Impaction\par -Discussed not using q-tips or instruments to remove wax\par -Olive or mineral oil 1x times every 2 week to keep ear canal lubricated. Discussed that the ear is a self cleaning structure and just allow it clean itself. If wax builds up can try debrox. Once it gets impacted recommend return to get it cleaned out.\par \par -Return to clinic annually to monitor hearing loss or sooner if new/worsen symptoms present\par \par

## 2022-10-28 NOTE — REASON FOR VISIT
[Initial Consultation] : an initial consultation for [FreeTextEntry2] :  tinnitus in both  ears and itching

## 2022-11-23 ENCOUNTER — APPOINTMENT (OUTPATIENT)
Dept: THORACIC SURGERY | Facility: CLINIC | Age: 77
End: 2022-11-23

## 2022-11-23 PROCEDURE — 99443: CPT | Mod: 95

## 2022-11-23 RX ORDER — TAMSULOSIN HYDROCHLORIDE 0.4 MG/1
0.4 CAPSULE ORAL
Qty: 90 | Refills: 3 | Status: COMPLETED | COMMUNITY
Start: 2019-01-16 | End: 2022-11-23

## 2022-11-23 NOTE — REASON FOR VISIT
[Follow-Up: _____] : a [unfilled] follow-up visit [Home] : at home, [unfilled] , at the time of the visit. [Medical Office: ([facility])___] : at the medical office located in  [Verbal consent obtained from patient] : the patient, [unfilled]

## 2022-11-25 NOTE — CONSULT LETTER
[Dear  ___] : Dear  [unfilled], [Courtesy Letter:] : I had the pleasure of seeing your patient, [unfilled], in my office today. [( Thank you for referring [unfilled] for consultation for _____ )] : Thank you for referring [unfilled] for consultation for [unfilled] [Please see my note below.] : Please see my note below. [Consult Closing:] : Thank you very much for allowing me to participate in the care of this patient.  If you have any questions, please do not hesitate to contact me. [Sincerely,] : Sincerely, [FreeTextEntry2] : Dr. Arsalan Charlton (Pulm/Ref)\par Dr. Laith Crisostomo (PCP)\par Dr. Soni (Card)  [FreeTextEntry3] : Ambrose Jacobson MD, FACS \par , Division of Thoracic Surgery \par [facility] \par Chief, Thoracic Surgery \par [facility] \par Department of Cardiovascular & Thoracic Surgery \par  \par [facility]\par

## 2022-11-25 NOTE — ASSESSMENT
[FreeTextEntry1] : Mr. Jordon Raman is a 76 y/o male who presents today for postop evaluation S/P bronchoscopy, right VATS, wedge resection of RUL on 4/15/19. Pathology revealed T1MiNx minimally invasive adenocarcinoma. \par \par I would like to refer pt to Pulm Dr. Bridgette Ponce for further evaluation and persistent cough. RTC as scheduled. \par \par \par I, REBEKAH Meza, personally performed the evaluation and management (E/M) services for this established patient who presents today with (a) new problem(s)/exacerbation of (an) existing condition(s).  That E/M includes conducting the examination, assessing all new/exacerbated conditions, and establishing a new plan of care.  Today, my ACP, Candida Fisher NP was here to observe my evaluation and management services for this new problem/exacerbated condition to be followed going forward.\par

## 2022-11-25 NOTE — HISTORY OF PRESENT ILLNESS
[FreeTextEntry1] : Mr. Jordon Raman is a 76 y/o male who presents today for postop evaluation S/P bronchoscopy, right VATS, wedge resection of RUL on 4/15/19. Pathology revealed T1MiNx minimally invasive adenocarcinoma. \par \par He has history of HLD, kidney cancer s/p right nephrectomy in 1991 at [facility]. He was followed by his pulmonologist for an enlarging right lung nodule. \par \par CT Chest w/o contrast on 2/23/2021:\par - Stable postsurgical changed from RUL wedge resection\par - No new evidence of disease recurrence \par - Scattered pleural plaques and calcifications, likely sequela of asbestos exposure\par - Stable hepatic hypodensity\par \par CT Chest on 2/24/22:\par - Stable appearance of the chest with postsurgical changes in the right upper lobe and asbestos-related pleural disease\par \par Patient is here today for follow up via telephonic. Pt c/o cough for 3 wks with some mucus, otherwise denies URI, fever, chills, SOB, hemoptysis or CP. Pt reports he does not like to take nay cough medication and is looking for recommendation. \par

## 2022-11-27 ENCOUNTER — NON-APPOINTMENT (OUTPATIENT)
Age: 77
End: 2022-11-27

## 2022-11-28 ENCOUNTER — EMERGENCY (EMERGENCY)
Facility: HOSPITAL | Age: 77
LOS: 0 days | Discharge: HOME | End: 2022-11-28
Attending: EMERGENCY MEDICINE

## 2022-11-28 VITALS
OXYGEN SATURATION: 96 % | SYSTOLIC BLOOD PRESSURE: 129 MMHG | HEART RATE: 94 BPM | RESPIRATION RATE: 16 BRPM | TEMPERATURE: 98 F | WEIGHT: 169.98 LBS | DIASTOLIC BLOOD PRESSURE: 77 MMHG | HEIGHT: 69 IN

## 2022-11-28 VITALS
SYSTOLIC BLOOD PRESSURE: 121 MMHG | DIASTOLIC BLOOD PRESSURE: 78 MMHG | RESPIRATION RATE: 16 BRPM | OXYGEN SATURATION: 98 % | HEART RATE: 77 BPM

## 2022-11-28 DIAGNOSIS — Q60.0 RENAL AGENESIS, UNILATERAL: ICD-10-CM

## 2022-11-28 DIAGNOSIS — Z90.5 ACQUIRED ABSENCE OF KIDNEY: Chronic | ICD-10-CM

## 2022-11-28 DIAGNOSIS — Z20.822 CONTACT WITH AND (SUSPECTED) EXPOSURE TO COVID-19: ICD-10-CM

## 2022-11-28 DIAGNOSIS — Z79.82 LONG TERM (CURRENT) USE OF ASPIRIN: ICD-10-CM

## 2022-11-28 DIAGNOSIS — Z98.890 OTHER SPECIFIED POSTPROCEDURAL STATES: Chronic | ICD-10-CM

## 2022-11-28 DIAGNOSIS — I10 ESSENTIAL (PRIMARY) HYPERTENSION: ICD-10-CM

## 2022-11-28 DIAGNOSIS — K57.32 DIVERTICULITIS OF LARGE INTESTINE WITHOUT PERFORATION OR ABSCESS WITHOUT BLEEDING: ICD-10-CM

## 2022-11-28 DIAGNOSIS — R10.32 LEFT LOWER QUADRANT PAIN: ICD-10-CM

## 2022-11-28 LAB
ALBUMIN SERPL ELPH-MCNC: 3.8 G/DL — SIGNIFICANT CHANGE UP (ref 3.3–5)
ALP SERPL-CCNC: 71 U/L — SIGNIFICANT CHANGE UP (ref 40–120)
ALT FLD-CCNC: 20 U/L — SIGNIFICANT CHANGE UP (ref 12–78)
ANION GAP SERPL CALC-SCNC: 5 MMOL/L — SIGNIFICANT CHANGE UP (ref 5–17)
APPEARANCE UR: CLEAR — SIGNIFICANT CHANGE UP
AST SERPL-CCNC: 13 U/L — LOW (ref 15–37)
BASOPHILS # BLD AUTO: 0.06 K/UL — SIGNIFICANT CHANGE UP (ref 0–0.2)
BASOPHILS NFR BLD AUTO: 0.5 % — SIGNIFICANT CHANGE UP (ref 0–2)
BILIRUB SERPL-MCNC: 0.8 MG/DL — SIGNIFICANT CHANGE UP (ref 0.2–1.2)
BILIRUB UR-MCNC: NEGATIVE — SIGNIFICANT CHANGE UP
BUN SERPL-MCNC: 10 MG/DL — SIGNIFICANT CHANGE UP (ref 7–23)
CALCIUM SERPL-MCNC: 9.4 MG/DL — SIGNIFICANT CHANGE UP (ref 8.5–10.1)
CHLORIDE SERPL-SCNC: 102 MMOL/L — SIGNIFICANT CHANGE UP (ref 96–108)
CO2 SERPL-SCNC: 29 MMOL/L — SIGNIFICANT CHANGE UP (ref 22–31)
COLOR SPEC: YELLOW — SIGNIFICANT CHANGE UP
CREAT SERPL-MCNC: 1.11 MG/DL — SIGNIFICANT CHANGE UP (ref 0.5–1.3)
DIFF PNL FLD: NEGATIVE — SIGNIFICANT CHANGE UP
EGFR: 69 ML/MIN/1.73M2 — SIGNIFICANT CHANGE UP
EOSINOPHIL # BLD AUTO: 0.11 K/UL — SIGNIFICANT CHANGE UP (ref 0–0.5)
EOSINOPHIL NFR BLD AUTO: 0.9 % — SIGNIFICANT CHANGE UP (ref 0–6)
FLUAV AG NPH QL: SIGNIFICANT CHANGE UP
FLUBV AG NPH QL: SIGNIFICANT CHANGE UP
GLUCOSE SERPL-MCNC: 122 MG/DL — HIGH (ref 70–99)
GLUCOSE UR QL: NEGATIVE MG/DL — SIGNIFICANT CHANGE UP
HCT VFR BLD CALC: 43.9 % — SIGNIFICANT CHANGE UP (ref 39–50)
HGB BLD-MCNC: 14.7 G/DL — SIGNIFICANT CHANGE UP (ref 13–17)
IMM GRANULOCYTES NFR BLD AUTO: 0.5 % — SIGNIFICANT CHANGE UP (ref 0–0.9)
KETONES UR-MCNC: NEGATIVE — SIGNIFICANT CHANGE UP
LEUKOCYTE ESTERASE UR-ACNC: NEGATIVE — SIGNIFICANT CHANGE UP
LIDOCAIN IGE QN: 89 U/L — SIGNIFICANT CHANGE UP (ref 73–393)
LYMPHOCYTES # BLD AUTO: 18.4 % — SIGNIFICANT CHANGE UP (ref 13–44)
LYMPHOCYTES # BLD AUTO: 2.36 K/UL — SIGNIFICANT CHANGE UP (ref 1–3.3)
MAGNESIUM SERPL-MCNC: 2.2 MG/DL — SIGNIFICANT CHANGE UP (ref 1.6–2.6)
MCHC RBC-ENTMCNC: 30.6 PG — SIGNIFICANT CHANGE UP (ref 27–34)
MCHC RBC-ENTMCNC: 33.5 G/DL — SIGNIFICANT CHANGE UP (ref 32–36)
MCV RBC AUTO: 91.5 FL — SIGNIFICANT CHANGE UP (ref 80–100)
MONOCYTES # BLD AUTO: 1.19 K/UL — HIGH (ref 0–0.9)
MONOCYTES NFR BLD AUTO: 9.3 % — SIGNIFICANT CHANGE UP (ref 2–14)
NEUTROPHILS # BLD AUTO: 9.02 K/UL — HIGH (ref 1.8–7.4)
NEUTROPHILS NFR BLD AUTO: 70.4 % — SIGNIFICANT CHANGE UP (ref 43–77)
NITRITE UR-MCNC: NEGATIVE — SIGNIFICANT CHANGE UP
NRBC # BLD: 0 /100 WBCS — SIGNIFICANT CHANGE UP (ref 0–0)
PH UR: 6 — SIGNIFICANT CHANGE UP (ref 5–8)
PLATELET # BLD AUTO: 231 K/UL — SIGNIFICANT CHANGE UP (ref 150–400)
POTASSIUM SERPL-MCNC: 4.6 MMOL/L — SIGNIFICANT CHANGE UP (ref 3.5–5.3)
POTASSIUM SERPL-SCNC: 4.6 MMOL/L — SIGNIFICANT CHANGE UP (ref 3.5–5.3)
PROT SERPL-MCNC: 7.5 GM/DL — SIGNIFICANT CHANGE UP (ref 6–8.3)
PROT UR-MCNC: NEGATIVE MG/DL — SIGNIFICANT CHANGE UP
RBC # BLD: 4.8 M/UL — SIGNIFICANT CHANGE UP (ref 4.2–5.8)
RBC # FLD: 12.7 % — SIGNIFICANT CHANGE UP (ref 10.3–14.5)
SARS-COV-2 RNA SPEC QL NAA+PROBE: SIGNIFICANT CHANGE UP
SODIUM SERPL-SCNC: 136 MMOL/L — SIGNIFICANT CHANGE UP (ref 135–145)
SP GR SPEC: 1.01 — SIGNIFICANT CHANGE UP (ref 1.01–1.02)
UROBILINOGEN FLD QL: NEGATIVE MG/DL — SIGNIFICANT CHANGE UP
WBC # BLD: 12.8 K/UL — HIGH (ref 3.8–10.5)
WBC # FLD AUTO: 12.8 K/UL — HIGH (ref 3.8–10.5)

## 2022-11-28 PROCEDURE — 99284 EMERGENCY DEPT VISIT MOD MDM: CPT

## 2022-11-28 PROCEDURE — 74177 CT ABD & PELVIS W/CONTRAST: CPT | Mod: 26,MA

## 2022-11-28 RX ORDER — SODIUM CHLORIDE 9 MG/ML
1000 INJECTION INTRAMUSCULAR; INTRAVENOUS; SUBCUTANEOUS ONCE
Refills: 0 | Status: COMPLETED | OUTPATIENT
Start: 2022-11-28 | End: 2022-11-28

## 2022-11-28 RX ORDER — METRONIDAZOLE 500 MG
1 TABLET ORAL
Qty: 30 | Refills: 0
Start: 2022-11-28 | End: 2022-12-07

## 2022-11-28 RX ORDER — TAMSULOSIN HYDROCHLORIDE 0.4 MG/1
1 CAPSULE ORAL
Qty: 0 | Refills: 0 | DISCHARGE

## 2022-11-28 RX ORDER — METRONIDAZOLE 500 MG
500 TABLET ORAL ONCE
Refills: 0 | Status: COMPLETED | OUTPATIENT
Start: 2022-11-28 | End: 2022-11-28

## 2022-11-28 RX ORDER — ATORVASTATIN CALCIUM 80 MG/1
1 TABLET, FILM COATED ORAL
Qty: 0 | Refills: 0 | DISCHARGE

## 2022-11-28 RX ORDER — ASPIRIN/CALCIUM CARB/MAGNESIUM 324 MG
1 TABLET ORAL
Qty: 0 | Refills: 0 | DISCHARGE

## 2022-11-28 RX ORDER — ACETAMINOPHEN 500 MG
1 TABLET ORAL
Qty: 28 | Refills: 0
Start: 2022-11-28 | End: 2022-12-04

## 2022-11-28 RX ORDER — MORPHINE SULFATE 50 MG/1
4 CAPSULE, EXTENDED RELEASE ORAL ONCE
Refills: 0 | Status: DISCONTINUED | OUTPATIENT
Start: 2022-11-28 | End: 2022-11-28

## 2022-11-28 RX ORDER — CIPROFLOXACIN LACTATE 400MG/40ML
400 VIAL (ML) INTRAVENOUS ONCE
Refills: 0 | Status: COMPLETED | OUTPATIENT
Start: 2022-11-28 | End: 2022-11-28

## 2022-11-28 RX ORDER — TRAMADOL HYDROCHLORIDE 50 MG/1
1 TABLET ORAL
Qty: 15 | Refills: 0
Start: 2022-11-28 | End: 2022-12-02

## 2022-11-28 RX ORDER — CEFPODOXIME PROXETIL 100 MG
2 TABLET ORAL
Qty: 20 | Refills: 0
Start: 2022-11-28 | End: 2022-12-07

## 2022-11-28 RX ADMIN — Medication 500 MILLIGRAM(S): at 19:07

## 2022-11-28 RX ADMIN — SODIUM CHLORIDE 1000 MILLILITER(S): 9 INJECTION INTRAMUSCULAR; INTRAVENOUS; SUBCUTANEOUS at 19:07

## 2022-11-28 RX ADMIN — Medication 200 MILLIGRAM(S): at 19:07

## 2022-11-28 NOTE — ED PROVIDER NOTE - PATIENT PORTAL LINK FT
You can access the FollowMyHealth Patient Portal offered by [facility] by registering at the following website: [email or web address]. By joining [employer]’s FollowMyHealth portal, you will also be able to view your health information using other applications (apps) compatible with our system.

## 2022-11-28 NOTE — ED PROVIDER NOTE - CARE PROVIDER_API CALL
Carlyle Adrian)  Internal Medicine  [address], [address] [place] [address]  Phone: [phone]  Fax: [phone]  Follow Up Time: 4-6 Days

## 2022-11-28 NOTE — ED PROVIDER NOTE - PHYSICAL EXAMINATION
Gen: Alert, NAD  Head: NC, AT   Eyes: PERRL, EOMI, normal lids/conjunctiva  ENT: normal hearing, patent oropharynx without erythema/exudate, uvula midline  Neck: supple, no tenderness, Trachea midline  Pulm: Bilateral BS, normal resp effort, no wheeze/stridor/retractions  CV: RRR, no M/R/G, 2+ radial and dp pulses bl, no edema  Abd: soft, llq ttp. +BS, no hepatosplenomegaly  Mskel: extremities x4 with normal ROM and no joint effusions. no ctl spine ttp.   Skin: no rash, no bruising   Neuro: AAOx3, no sensory/motor deficits, CN 2-12 intact

## 2022-11-28 NOTE — ED PROVIDER NOTE - OBJECTIVE STATEMENT
77M hx solitary kidney, htn pw 1 day llq abd pn. no vomit, no fever, no diarrhea. pain is moderate. hasn't taken anything for pain  ros neg for ha, vis loss, rhinorrhea, cp, sob, numbness, rash, bleeding, dysuria, anxiety, depression testicular pain.

## 2022-11-28 NOTE — ED PROVIDER NOTE - NSFOLLOWUPINSTRUCTIONS_ED_ALL_ED_FT
Take BOTH antibiotics as prescribed for your DIVERTICULITIS - CEFPODOXIME and METRONIDAZOLE/FLAGYL.    Take ACETAMINOPHEN for moderate pain.    Add TRAMADOL for severe pain.    Follow up with your regular doctor.

## 2022-11-30 LAB
CULTURE RESULTS: SIGNIFICANT CHANGE UP
SPECIMEN SOURCE: SIGNIFICANT CHANGE UP

## 2022-12-08 ENCOUNTER — APPOINTMENT (OUTPATIENT)
Dept: PULMONOLOGY | Facility: CLINIC | Age: 77
End: 2022-12-08

## 2022-12-08 VITALS
RESPIRATION RATE: 16 BRPM | OXYGEN SATURATION: 97 % | TEMPERATURE: 97.7 F | BODY MASS INDEX: 25.62 KG/M2 | HEIGHT: 69 IN | DIASTOLIC BLOOD PRESSURE: 78 MMHG | WEIGHT: 173 LBS | HEART RATE: 96 BPM | SYSTOLIC BLOOD PRESSURE: 149 MMHG

## 2022-12-08 DIAGNOSIS — R05.9 COUGH, UNSPECIFIED: ICD-10-CM

## 2022-12-08 PROCEDURE — 90677 PCV20 VACCINE IM: CPT

## 2022-12-08 PROCEDURE — 99203 OFFICE O/P NEW LOW 30 MIN: CPT | Mod: 25

## 2022-12-08 PROCEDURE — G0009: CPT

## 2022-12-08 NOTE — PHYSICAL EXAM
[No Acute Distress] : no acute distress [Normal Oropharynx] : normal oropharynx [Normal Appearance] : normal appearance [No Neck Mass] : no neck mass [Normal Rate/Rhythm] : normal rate/rhythm [Normal S1, S2] : normal s1, s2 [No Murmurs] : no murmurs [No Resp Distress] : no resp distress [No Clubbing] : no clubbing [No Cyanosis] : no cyanosis [No Edema] : no edema [Normal Color/ Pigmentation] : normal color/ pigmentation [Oriented x3] : oriented x3 [Normal Affect] : normal affect

## 2022-12-08 NOTE — HISTORY OF PRESENT ILLNESS
[TextBox_4] : 77 year old male status post VATS wedge resection of RUL on 4/15/19 for adenocarcinoma- T1M1Nx \par Has history of HLD, kidney cancer post right nephrectomy in 1991 at [place].  He is followed by Dr. Jacobson- reports history of cough for 3 weeks.  Referred to me for evaluation of cough. \par \par His prior pulmonologist retired.  \par Cough for weeks productive of scant phlegm. Denies any shortness of breath.  Walks 10, 000 steps.  Denies wheezing.  No inhaler use, no smoking.  Has GERD for many years- uses pepcid intermittently.  Denies allergic rhinitis/postnasal congestion.  \par \par Worked in [place] [employer].  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  Had adenocarcinoma in RUL, thought to be related to his exposure to asbestos.

## 2022-12-08 NOTE — ASSESSMENT
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 cotto in 4/2019 who is referred for evaluation of cough for the last 4 weeks.  NOt associated with URI, allergic rhinitis.  NO history or symptoms suggestive of asthma. He has GERD and is experiencing almost daily symptoms.\par \par ON PE today VSS Lungs are clear.\par \par Ct Chest from 2/2022 personally reviewed by me.  Staple line noted.  He has pleural plaques some with calcium unchanged.  Lungs are otherwise clear.  \par \par IMpression : UACS likely secondary to GERD\par Plan:\par 1- Advised to resume Pepcid daily \par 2- Complete PFT\par F/U in 6 months.

## 2023-01-11 ENCOUNTER — APPOINTMENT (OUTPATIENT)
Dept: PULMONOLOGY | Facility: CLINIC | Age: 78
End: 2023-01-11
Payer: MEDICARE

## 2023-01-11 VITALS
HEIGHT: 69 IN | BODY MASS INDEX: 25.92 KG/M2 | HEART RATE: 80 BPM | SYSTOLIC BLOOD PRESSURE: 136 MMHG | TEMPERATURE: 98 F | DIASTOLIC BLOOD PRESSURE: 87 MMHG | WEIGHT: 175 LBS | OXYGEN SATURATION: 98 %

## 2023-01-11 PROCEDURE — 94010 BREATHING CAPACITY TEST: CPT

## 2023-01-11 PROCEDURE — 94726 PLETHYSMOGRAPHY LUNG VOLUMES: CPT

## 2023-01-11 PROCEDURE — 94729 DIFFUSING CAPACITY: CPT

## 2023-02-02 ENCOUNTER — APPOINTMENT (OUTPATIENT)
Dept: UROLOGY | Facility: CLINIC | Age: 78
End: 2023-02-02
Payer: MEDICARE

## 2023-02-02 VITALS — DIASTOLIC BLOOD PRESSURE: 80 MMHG | SYSTOLIC BLOOD PRESSURE: 138 MMHG

## 2023-02-02 DIAGNOSIS — R97.20 ELEVATED PROSTATE, SPECIFIC ANTIGEN [PSA]: ICD-10-CM

## 2023-02-02 LAB
PSA FREE FLD-MCNC: 27 %
PSA FREE SERPL-MCNC: 0.72 NG/ML
PSA SERPL-MCNC: 2.66 NG/ML

## 2023-02-02 PROCEDURE — 99214 OFFICE O/P EST MOD 30 MIN: CPT

## 2023-02-12 ENCOUNTER — NON-APPOINTMENT (OUTPATIENT)
Age: 78
End: 2023-02-12

## 2023-02-21 ENCOUNTER — APPOINTMENT (OUTPATIENT)
Dept: CT IMAGING | Facility: CLINIC | Age: 78
End: 2023-02-21
Payer: MEDICARE

## 2023-02-21 ENCOUNTER — OUTPATIENT (OUTPATIENT)
Dept: OUTPATIENT SERVICES | Facility: HOSPITAL | Age: 78
LOS: 1 days | End: 2023-02-21
Payer: MEDICARE

## 2023-02-21 DIAGNOSIS — Z98.890 OTHER SPECIFIED POSTPROCEDURAL STATES: Chronic | ICD-10-CM

## 2023-02-21 DIAGNOSIS — C34.90 MALIGNANT NEOPLASM OF UNSPECIFIED PART OF UNSPECIFIED BRONCHUS OR LUNG: ICD-10-CM

## 2023-02-21 DIAGNOSIS — Z90.5 ACQUIRED ABSENCE OF KIDNEY: Chronic | ICD-10-CM

## 2023-02-21 PROCEDURE — 71250 CT THORAX DX C-: CPT | Mod: 26,MH

## 2023-02-21 PROCEDURE — 71250 CT THORAX DX C-: CPT

## 2023-03-01 ENCOUNTER — NON-APPOINTMENT (OUTPATIENT)
Age: 78
End: 2023-03-01

## 2023-03-01 ENCOUNTER — APPOINTMENT (OUTPATIENT)
Dept: THORACIC SURGERY | Facility: CLINIC | Age: 78
End: 2023-03-01
Payer: MEDICARE

## 2023-03-01 VITALS
BODY MASS INDEX: 26.07 KG/M2 | DIASTOLIC BLOOD PRESSURE: 71 MMHG | HEIGHT: 68 IN | SYSTOLIC BLOOD PRESSURE: 142 MMHG | OXYGEN SATURATION: 99 % | WEIGHT: 172 LBS | RESPIRATION RATE: 18 BRPM | HEART RATE: 82 BPM

## 2023-03-01 DIAGNOSIS — C34.90 MALIGNANT NEOPLASM OF UNSPECIFIED PART OF UNSPECIFIED BRONCHUS OR LUNG: ICD-10-CM

## 2023-03-01 DIAGNOSIS — R91.8 OTHER NONSPECIFIC ABNORMAL FINDING OF LUNG FIELD: ICD-10-CM

## 2023-03-01 PROCEDURE — 99214 OFFICE O/P EST MOD 30 MIN: CPT

## 2023-03-02 NOTE — CONSULT LETTER
[FreeTextEntry2] : Dr. Arsalan Charlton (Pulm/Ref)\par Dr. Laith Crisostomo (PCP)\par Dr. Soni (Card)  [FreeTextEntry3] : Ambrose Jacobson MD, FACS \par , Division of Thoracic Surgery \par [facility] \par Chief, Thoracic Surgery \par [facility] \par Department of Cardiovascular & Thoracic Surgery \par  \par [facility]\par

## 2023-03-02 NOTE — HISTORY OF PRESENT ILLNESS
[FreeTextEntry1] : Mr. Jordon Raman is a 78 y/o male who presents today for postop evaluation S/P bronchoscopy, right VATS, wedge resection of RUL on 4/15/19. Pathology revealed T1MiNx minimally invasive adenocarcinoma. \par \par He has history of HLD, kidney cancer s/p right nephrectomy in 1991 at [facility]. He was followed by his pulmonologist for an enlarging right lung nodule. \par \par CT Chest w/o contrast on 2/23/2021:\par - Stable postsurgical changed from RUL wedge resection\par - No new evidence of disease recurrence \par - Scattered pleural plaques and calcifications, likely sequela of asbestos exposure\par - Stable hepatic hypodensity\par \par CT Chest on 2/24/22:\par - Stable appearance of the chest with postsurgical changes in the right upper lobe and asbestos-related pleural disease\par \par Seen on 11/23/2022: Pt c/o cough for 3 wks with some mucus, I referred pt to pulm Dr. Bridgette Ponce for further evaluation and persistent cough. RTC as scheduled. \par \par Dr. Ponce on 12/08/2022: Plan for PFTs, continue pepcid, follow up in 6 months.\par \par PFTs on 01/11/2023: FVC 4.66, 115%; FEV1 3.46, 118%; DLCO 27.9, 114%\par \par CT Chest on 02/21/2023:\par - Status post right upper lobe wedge resection with stable postsurgical changes. \par - Unchanged 5 mm groundglass nodule within anterior right lower lobe (series 2 image 57).\par - Stable small calcified and noncalcified pleural plaques.\par \par \par He presents for follow up. Overall, he reports to be feeling well. Denies any chest pain, shortness of breath, cough, or hemoptysis. \par

## 2023-03-02 NOTE — ASSESSMENT
[FreeTextEntry1] : Mr. Jordon Raman is a 76 y/o male who presents today for postop evaluation S/P bronchoscopy, right VATS, wedge resection of RUL on 4/15/19. Pathology revealed T1MiNx minimally invasive adenocarcinoma. \par \par I have independently reviewed the medical records and imaging at the time of this office consultation. CT Chest reviewed with patient, stable findings. I recommend he returns to clinic in 1 year with repeat CT Chest without contrast.\par \par Recommendations reviewed with patient during this office visit, and all questions answered; Patient instructed on the importance of follow up and verbalizes understanding. \par \par \par I, REBEKAH Meza, personally performed the evaluation and management (E/M) services for this established patient. That E/M includes conducting the examination, assessing all new/exacerbated conditions, and establishing a new plan of care. Today, my ACP, Slava Evans NP, was here to observe my evaluation and management services for this new problem/exacerbated condition to be followed going forward.\par \par

## 2023-04-05 NOTE — ED ADULT TRIAGE NOTE - PAIN RATING/NUMBER SCALE (0-10): ACTIVITY

## 2023-06-13 ENCOUNTER — APPOINTMENT (OUTPATIENT)
Dept: PULMONOLOGY | Facility: CLINIC | Age: 78
End: 2023-06-13
Payer: MEDICARE

## 2023-06-13 VITALS
HEIGHT: 68 IN | TEMPERATURE: 98 F | BODY MASS INDEX: 26.52 KG/M2 | RESPIRATION RATE: 15 BRPM | OXYGEN SATURATION: 93 % | DIASTOLIC BLOOD PRESSURE: 72 MMHG | SYSTOLIC BLOOD PRESSURE: 131 MMHG | WEIGHT: 175 LBS | HEART RATE: 87 BPM

## 2023-06-13 DIAGNOSIS — K21.9 GASTRO-ESOPHAGEAL REFLUX DISEASE W/OUT ESOPHAGITIS: ICD-10-CM

## 2023-06-13 DIAGNOSIS — R93.89 ABNORMAL FINDINGS ON DIAGNOSTIC IMAGING OF OTHER SPECIFIED BODY STRUCTURES: ICD-10-CM

## 2023-06-13 PROCEDURE — 99214 OFFICE O/P EST MOD 30 MIN: CPT

## 2023-06-13 NOTE — PHYSICAL EXAM
[No Acute Distress] : no acute distress [Normal Oropharynx] : normal oropharynx [Normal Appearance] : normal appearance [No Neck Mass] : no neck mass [Normal Rate/Rhythm] : normal rate/rhythm [Normal S1, S2] : normal s1, s2 [No Murmurs] : no murmurs [No Resp Distress] : no resp distress [Clear to Auscultation Bilaterally] : clear to auscultation bilaterally [No Clubbing] : no clubbing [No Cyanosis] : no cyanosis [No Edema] : no edema [Oriented x3] : oriented x3 [Normal Affect] : normal affect

## 2023-06-13 NOTE — HISTORY OF PRESENT ILLNESS
[Never] : never [TextBox_4] : 77 year old male status post VATS wedge resection of RUL on 4/15/19 for adenocarcinoma- T1M1Nx \sherman Has history of HLD, kidney cancer post right nephrectomy in 1991 at [place].  He is followed by Dr. Jacobson- reports history of cough for 3 weeks.  Referred to me for evaluation of cough I evaluated him initially in December 2022.\par \sherman Worked in [place] [employer].  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  Had adenocarcinoma in RUL, thought to be related to his exposure to asbestos.  \par \par Pulmonary function tests performed in January 2023 were entirely within normal limits.\par \par At time of evaluation I thought he had upper airway cough syndrome drome.  I recommended that he continue Pepcid.\par \par Recent chest CT performed in March 2023 showed stable postsurgical changes and stable calcified and noncalcified pleural plaques he had a 5 mm groundglass node nodule in the anterior right lower lobe which was unchanged.  This was compared to an earlier CT from February 2022. I personally reviewed both CTS.  \par \par States that cough resolved after treatment for GERd.  No cough, no shortness of breath.   He feels well.

## 2023-06-13 NOTE — ASSESSMENT
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 cotto in 4/2019  Initially evaluated by me for cough, which has resolved with treatment of GERD. \par He denies all pulmonary symptoms.  \par \par ON PE today VSS Lungs are clear.\par \par PFTs in 1//2023 were WNL.\par \par Review of recent CT Chest from 2/21/2023 shows 5mm GGO in RLL anteriorly just under major fissure which on review was present in 2/2022 and is unchanged.  This was personally reviewed by me.  \par \par IMpression : normal PFT, cough related to gERD has resolved. GGO 5mm solitary in this patient with history of lung cancer would consider F/U CT Chest in 2 and 4 years to document stability.  \par \par Mr. Raman will be getting  another CT Chest by Dr. Cotto in one year.  \par \par Plan:\par 1- Advised to resume Pepcid if cough returns.\par 2- F/U as needed.

## 2023-08-18 ENCOUNTER — NON-APPOINTMENT (OUTPATIENT)
Age: 78
End: 2023-08-18

## 2023-10-12 NOTE — ASU PATIENT PROFILE, ADULT - MEDICATION ADMINISTRATION INFO, PROFILE
Surgeon: Babs Vallejo MD  Your surgery will be at [facility] [place]  First floor of the [address] [place] [address] S  [address]. [place], [address]    Date:    Arrival time:    Surgery time:     (   ) Outpatient with general anesthesia         (   ) Outpatient with IV sedation            You will need to have a  drive you home from the hospital.   Nothing to eat or drink after midnight the night before. FASTING SURGERY. You may take all your regular maintenance prescriptions in the morning with a small sip of water to wash them down. No driving for 1 week after surgery. (Or while on pain medication)     You may be asked to stop blood thinners 2-5 days prior to surgery such as Coumadin, Plavix, Iam Bia, Eliquis,and over the counter fish oil (Omega 3)     Last dose: ______________    Restrictions: 15lbs weight restrictions for 4 weeks after surgery. *Within 30 days of surgery*     (   )Primary Care Clearance             (   ) OTHER : ________________________       Neal Primrose will need to bring a photo ID and insurance card the day of surgery. Please contact Arti if you need to reschedule your surgery.    Office phone number:     [phone]  Fax number for [facility]:    [phone] no concerns

## 2023-10-19 ENCOUNTER — APPOINTMENT (OUTPATIENT)
Dept: OTOLARYNGOLOGY | Facility: CLINIC | Age: 78
End: 2023-10-19

## 2023-10-24 ENCOUNTER — NON-APPOINTMENT (OUTPATIENT)
Age: 78
End: 2023-10-24

## 2023-10-25 ENCOUNTER — APPOINTMENT (OUTPATIENT)
Dept: OTOLARYNGOLOGY | Facility: CLINIC | Age: 78
End: 2023-10-25
Payer: MEDICARE

## 2023-10-25 VITALS
BODY MASS INDEX: 25.76 KG/M2 | OXYGEN SATURATION: 97 % | HEIGHT: 68 IN | RESPIRATION RATE: 17 BRPM | WEIGHT: 170 LBS | HEART RATE: 76 BPM | DIASTOLIC BLOOD PRESSURE: 84 MMHG | SYSTOLIC BLOOD PRESSURE: 136 MMHG

## 2023-10-25 DIAGNOSIS — H93.293 OTHER ABNORMAL AUDITORY PERCEPTIONS, BILATERAL: ICD-10-CM

## 2023-10-25 DIAGNOSIS — H90.3 SENSORINEURAL HEARING LOSS, BILATERAL: ICD-10-CM

## 2023-10-25 DIAGNOSIS — H93.13 TINNITUS, BILATERAL: ICD-10-CM

## 2023-10-25 DIAGNOSIS — H61.22 IMPACTED CERUMEN, LEFT EAR: ICD-10-CM

## 2023-10-25 PROCEDURE — G0268 REMOVAL OF IMPACTED WAX MD: CPT

## 2023-10-25 PROCEDURE — 92557 COMPREHENSIVE HEARING TEST: CPT

## 2023-10-25 PROCEDURE — 92567 TYMPANOMETRY: CPT

## 2023-10-25 PROCEDURE — 99214 OFFICE O/P EST MOD 30 MIN: CPT | Mod: 25

## 2024-01-16 ENCOUNTER — NON-APPOINTMENT (OUTPATIENT)
Age: 79
End: 2024-01-16

## 2024-01-18 ENCOUNTER — APPOINTMENT (OUTPATIENT)
Dept: ORTHOPEDIC SURGERY | Facility: CLINIC | Age: 79
End: 2024-01-18
Payer: MEDICARE

## 2024-01-18 VITALS — BODY MASS INDEX: 26.07 KG/M2 | HEIGHT: 68 IN | WEIGHT: 172 LBS

## 2024-01-18 PROCEDURE — 99213 OFFICE O/P EST LOW 20 MIN: CPT | Mod: 25

## 2024-01-18 PROCEDURE — 20610 DRAIN/INJ JOINT/BURSA W/O US: CPT | Mod: LT

## 2024-01-18 PROCEDURE — 73562 X-RAY EXAM OF KNEE 3: CPT | Mod: LT

## 2024-02-07 ENCOUNTER — APPOINTMENT (OUTPATIENT)
Dept: UROLOGY | Facility: CLINIC | Age: 79
End: 2024-02-07
Payer: MEDICARE

## 2024-02-07 DIAGNOSIS — N13.8 BENIGN PROSTATIC HYPERPLASIA WITH LOWER URINARY TRACT SYMPMS: ICD-10-CM

## 2024-02-07 DIAGNOSIS — N40.1 BENIGN PROSTATIC HYPERPLASIA WITH LOWER URINARY TRACT SYMPMS: ICD-10-CM

## 2024-02-07 PROCEDURE — 99214 OFFICE O/P EST MOD 30 MIN: CPT

## 2024-02-07 PROCEDURE — G2211 COMPLEX E/M VISIT ADD ON: CPT

## 2024-02-08 LAB
APPEARANCE: CLEAR
BACTERIA: NEGATIVE /HPF
BILIRUBIN URINE: NEGATIVE
BLOOD URINE: NEGATIVE
CAST: 0 /LPF
COLOR: YELLOW
EPITHELIAL CELLS: 0 /HPF
GLUCOSE QUALITATIVE U: NEGATIVE MG/DL
KETONES URINE: NEGATIVE MG/DL
LEUKOCYTE ESTERASE URINE: NEGATIVE
MICROSCOPIC-UA: NORMAL
NITRITE URINE: NEGATIVE
PH URINE: 7
PROTEIN URINE: NEGATIVE MG/DL
PSA FREE FLD-MCNC: 24 %
PSA FREE SERPL-MCNC: 0.81 NG/ML
PSA SERPL-MCNC: 3.32 NG/ML
RED BLOOD CELLS URINE: 0 /HPF
SPECIFIC GRAVITY URINE: 1.01
UROBILINOGEN URINE: 0.2 MG/DL
WHITE BLOOD CELLS URINE: 0 /HPF

## 2024-02-28 ENCOUNTER — OUTPATIENT (OUTPATIENT)
Dept: OUTPATIENT SERVICES | Facility: HOSPITAL | Age: 79
LOS: 1 days | End: 2024-02-28
Payer: MEDICARE

## 2024-02-28 ENCOUNTER — APPOINTMENT (OUTPATIENT)
Dept: CT IMAGING | Facility: CLINIC | Age: 79
End: 2024-02-28
Payer: MEDICARE

## 2024-02-28 DIAGNOSIS — Z98.890 OTHER SPECIFIED POSTPROCEDURAL STATES: Chronic | ICD-10-CM

## 2024-02-28 DIAGNOSIS — C34.90 MALIGNANT NEOPLASM OF UNSPECIFIED PART OF UNSPECIFIED BRONCHUS OR LUNG: ICD-10-CM

## 2024-02-28 DIAGNOSIS — Z90.5 ACQUIRED ABSENCE OF KIDNEY: Chronic | ICD-10-CM

## 2024-02-28 PROCEDURE — 71250 CT THORAX DX C-: CPT | Mod: 26,MH

## 2024-02-28 PROCEDURE — 71250 CT THORAX DX C-: CPT

## 2024-03-04 ENCOUNTER — APPOINTMENT (OUTPATIENT)
Dept: ORTHOPEDIC SURGERY | Facility: CLINIC | Age: 79
End: 2024-03-04
Payer: MEDICARE

## 2024-03-04 VITALS
SYSTOLIC BLOOD PRESSURE: 146 MMHG | HEIGHT: 68 IN | WEIGHT: 170 LBS | DIASTOLIC BLOOD PRESSURE: 76 MMHG | HEART RATE: 86 BPM | BODY MASS INDEX: 25.76 KG/M2

## 2024-03-04 DIAGNOSIS — M17.12 UNILATERAL PRIMARY OSTEOARTHRITIS, LEFT KNEE: ICD-10-CM

## 2024-03-04 DIAGNOSIS — M11.262 OTHER CHONDROCALCINOSIS, LEFT KNEE: ICD-10-CM

## 2024-03-04 PROCEDURE — 99213 OFFICE O/P EST LOW 20 MIN: CPT

## 2024-03-13 ENCOUNTER — NON-APPOINTMENT (OUTPATIENT)
Age: 79
End: 2024-03-13

## 2024-03-13 ENCOUNTER — APPOINTMENT (OUTPATIENT)
Dept: THORACIC SURGERY | Facility: CLINIC | Age: 79
End: 2024-03-13
Payer: MEDICARE

## 2024-03-13 VITALS
BODY MASS INDEX: 25.76 KG/M2 | DIASTOLIC BLOOD PRESSURE: 78 MMHG | WEIGHT: 170 LBS | SYSTOLIC BLOOD PRESSURE: 139 MMHG | HEIGHT: 68 IN | RESPIRATION RATE: 17 BRPM | OXYGEN SATURATION: 99 % | HEART RATE: 79 BPM

## 2024-03-13 DIAGNOSIS — C34.90 MALIGNANT NEOPLASM OF UNSPECIFIED PART OF UNSPECIFIED BRONCHUS OR LUNG: ICD-10-CM

## 2024-03-13 PROCEDURE — 99213 OFFICE O/P EST LOW 20 MIN: CPT

## 2024-03-13 NOTE — PHYSICAL EXAM
[] : no respiratory distress [Auscultation Breath Sounds / Voice Sounds] : lungs were clear to auscultation bilaterally [Heart Rate And Rhythm] : heart rate was normal and rhythm regular [Examination Of The Chest] : the chest was normal in appearance [Chest Visual Inspection Thoracic Asymmetry] : no chest asymmetry [Diminished Respiratory Excursion] : normal chest expansion

## 2024-03-13 NOTE — ASSESSMENT
[FreeTextEntry1] : Mr. Jordon Raman is a 77 y/o male who presents today for postop evaluation S/P bronchoscopy, right VATS, wedge resection of RUL on 4/15/19. Pathology revealed T1MiNx minimally invasive adenocarcinoma.   He presents today for follow up with imaging.  I have independently reviewed the medical records and imaging at the time of this office consultation. 1. CT chest reviewed: stable findings. Recommended to repeat CT with no contrast in 18 months for surveillance.  Recommendations reviewed with patient during this office visit, and all questions answered; Patient instructed on the importance of follow up and verbalizes understanding.   I, REBEKAH Meza, personally performed the evaluation and management (E/M) services for this established patient who presents today with (a) new problem(s)/exacerbation of (an) existing condition(s).  That E/M includes conducting the examination, assessing all new/exacerbated conditions, and establishing a new plan of care.  Today, my ACP, Candida Fisher/LINDSAY Garcia, were here to observe my evaluation and management services for this new problem/exacerbated condition to be followed going forward.

## 2024-03-13 NOTE — CONSULT LETTER
[FreeTextEntry2] : Dr. Arsalan Charlton (Pulm/Ref)\par  Dr. Laith Crisostomo (PCP)\par  Dr. Soni (Card)  [FreeTextEntry3] : Ambrose Jacobson MD, FACS \par  , Division of Thoracic Surgery \par  [facility] \par  Chief, Thoracic Surgery \par  [facility] \par  Department of Cardiovascular & Thoracic Surgery \par   \par  [facility] [employer] [facility]\par

## 2024-03-13 NOTE — HISTORY OF PRESENT ILLNESS
[FreeTextEntry1] : Mr. Jordon Raman is a 79 y/o male who presents today for postop evaluation S/P bronchoscopy, right VATS, wedge resection of RUL on 4/15/19. Pathology revealed T1MiNx minimally invasive adenocarcinoma.   He has history of HLD, kidney cancer s/p right nephrectomy in 1991 at [facility]. He was followed by his pulmonologist for an enlarging right lung nodule.   Dr. Ponce on 12/08/2022: Plan for PFTs, continue pepcid, follow up in 6 months.  PFTs on 01/11/2023: FVC 4.66, 115%; FEV1 3.46, 118%; DLCO 27.9, 114%  CT Chest on 02/21/2023: - Status post right upper lobe wedge resection with stable postsurgical changes.  - Unchanged 5 mm groundglass nodule within anterior right lower lobe (series 2 image 57). - Stable small calcified and noncalcified pleural plaques.  CT Chest on 02/28/2024: - Right upper lobe wedge resection.  - Unchanged 5 mm groundglass nodules in the right lower lobe (2-60) and the left upper lobe (2-37). 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  He presents today for 1 year follow up. Patient reports doing well, he denies any CP, SOB, cough or hemoptysis.

## 2024-04-02 RX ORDER — TAMSULOSIN HYDROCHLORIDE 0.4 MG/1
0.4 CAPSULE ORAL
Qty: 90 | Refills: 3 | Status: ACTIVE | COMMUNITY
Start: 2020-02-13 | End: 1900-01-01

## 2024-05-06 NOTE — PHYSICAL THERAPY INITIAL EVALUATION ADULT - LEVEL OF INDEPENDENCE: SIT/STAND, REHAB EVAL
Continue Regimen: fluorouracil 5 % topical cream : apply to warts nightly and occlude with bandaid. Render In Strict Bullet Format?: No Detail Level: Zone contact guard

## 2024-05-09 ENCOUNTER — APPOINTMENT (OUTPATIENT)
Dept: PHARMACY | Facility: CLINIC | Age: 79
End: 2024-05-09
Payer: SELF-PAY

## 2024-05-09 PROCEDURE — V5010 ASSESSMENT FOR HEARING AID: CPT | Mod: NC

## 2024-10-11 ENCOUNTER — NON-APPOINTMENT (OUTPATIENT)
Age: 79
End: 2024-10-11

## 2024-10-16 ENCOUNTER — APPOINTMENT (OUTPATIENT)
Dept: OTOLARYNGOLOGY | Facility: CLINIC | Age: 79
End: 2024-10-16
Payer: MEDICARE

## 2024-10-16 VITALS
HEART RATE: 81 BPM | DIASTOLIC BLOOD PRESSURE: 77 MMHG | BODY MASS INDEX: 26.07 KG/M2 | WEIGHT: 172 LBS | HEIGHT: 68 IN | SYSTOLIC BLOOD PRESSURE: 138 MMHG

## 2024-10-16 DIAGNOSIS — H61.23 IMPACTED CERUMEN, BILATERAL: ICD-10-CM

## 2024-10-16 DIAGNOSIS — H93.13 TINNITUS, BILATERAL: ICD-10-CM

## 2024-10-16 DIAGNOSIS — H90.3 SENSORINEURAL HEARING LOSS, BILATERAL: ICD-10-CM

## 2024-10-16 DIAGNOSIS — H93.293 OTHER ABNORMAL AUDITORY PERCEPTIONS, BILATERAL: ICD-10-CM

## 2024-10-16 PROCEDURE — 69210 REMOVE IMPACTED EAR WAX UNI: CPT

## 2024-10-16 PROCEDURE — 99213 OFFICE O/P EST LOW 20 MIN: CPT | Mod: 25

## 2024-10-29 ENCOUNTER — NON-APPOINTMENT (OUTPATIENT)
Age: 79
End: 2024-10-29

## 2025-02-12 ENCOUNTER — APPOINTMENT (OUTPATIENT)
Dept: UROLOGY | Facility: CLINIC | Age: 80
End: 2025-02-12
Payer: MEDICARE

## 2025-02-12 DIAGNOSIS — N13.8 BENIGN PROSTATIC HYPERPLASIA WITH LOWER URINARY TRACT SYMPMS: ICD-10-CM

## 2025-02-12 DIAGNOSIS — N40.1 BENIGN PROSTATIC HYPERPLASIA WITH LOWER URINARY TRACT SYMPMS: ICD-10-CM

## 2025-02-12 PROCEDURE — G2211 COMPLEX E/M VISIT ADD ON: CPT

## 2025-02-12 PROCEDURE — 99214 OFFICE O/P EST MOD 30 MIN: CPT

## 2025-02-13 LAB
APPEARANCE: CLEAR
BACTERIA: NEGATIVE /HPF
BILIRUBIN URINE: NEGATIVE
BLOOD URINE: NEGATIVE
CAST: 0 /LPF
COLOR: YELLOW
EPITHELIAL CELLS: 0 /HPF
GLUCOSE QUALITATIVE U: NEGATIVE MG/DL
KETONES URINE: NEGATIVE MG/DL
LEUKOCYTE ESTERASE URINE: NEGATIVE
MICROSCOPIC-UA: NORMAL
NITRITE URINE: NEGATIVE
PH URINE: 7
PROTEIN URINE: NEGATIVE MG/DL
PSA FREE FLD-MCNC: 25 %
PSA FREE SERPL-MCNC: 0.83 NG/ML
PSA SERPL-MCNC: 3.24 NG/ML
RED BLOOD CELLS URINE: 0 /HPF
SPECIFIC GRAVITY URINE: 1.01
UROBILINOGEN URINE: 0.2 MG/DL
WHITE BLOOD CELLS URINE: 0 /HPF

## 2025-03-24 ENCOUNTER — APPOINTMENT (OUTPATIENT)
Dept: ORTHOPEDIC SURGERY | Facility: CLINIC | Age: 80
End: 2025-03-24
Payer: MEDICARE

## 2025-03-24 VITALS
HEART RATE: 80 BPM | SYSTOLIC BLOOD PRESSURE: 131 MMHG | DIASTOLIC BLOOD PRESSURE: 80 MMHG | BODY MASS INDEX: 26.07 KG/M2 | HEIGHT: 68 IN | WEIGHT: 172 LBS

## 2025-03-24 DIAGNOSIS — M65.961 UNSPECIFIED SYNOVITIS AND TENOSYNOVITIS, RIGHT LOWER LEG: ICD-10-CM

## 2025-03-24 DIAGNOSIS — M17.11 UNILATERAL PRIMARY OSTEOARTHRITIS, RIGHT KNEE: ICD-10-CM

## 2025-03-24 PROCEDURE — 20610 DRAIN/INJ JOINT/BURSA W/O US: CPT | Mod: RT

## 2025-03-24 PROCEDURE — 99213 OFFICE O/P EST LOW 20 MIN: CPT | Mod: 25

## 2025-03-24 PROCEDURE — 73562 X-RAY EXAM OF KNEE 3: CPT | Mod: RT

## 2025-05-06 ENCOUNTER — APPOINTMENT (OUTPATIENT)
Dept: CT IMAGING | Facility: CLINIC | Age: 80
End: 2025-05-06
Payer: MEDICARE

## 2025-05-06 ENCOUNTER — OUTPATIENT (OUTPATIENT)
Dept: OUTPATIENT SERVICES | Facility: HOSPITAL | Age: 80
LOS: 1 days | End: 2025-05-06
Payer: MEDICARE

## 2025-05-06 DIAGNOSIS — Z98.890 OTHER SPECIFIED POSTPROCEDURAL STATES: Chronic | ICD-10-CM

## 2025-05-06 DIAGNOSIS — C34.90 MALIGNANT NEOPLASM OF UNSPECIFIED PART OF UNSPECIFIED BRONCHUS OR LUNG: ICD-10-CM

## 2025-05-06 DIAGNOSIS — Z90.5 ACQUIRED ABSENCE OF KIDNEY: Chronic | ICD-10-CM

## 2025-05-06 PROCEDURE — 71260 CT THORAX DX C+: CPT | Mod: 26

## 2025-05-06 PROCEDURE — 74178 CT ABD&PLV WO CNTR FLWD CNTR: CPT | Mod: 26

## 2025-05-06 PROCEDURE — 74178 CT ABD&PLV WO CNTR FLWD CNTR: CPT

## 2025-05-06 PROCEDURE — 71260 CT THORAX DX C+: CPT

## 2025-05-07 ENCOUNTER — NON-APPOINTMENT (OUTPATIENT)
Age: 80
End: 2025-05-07

## 2025-05-08 ENCOUNTER — APPOINTMENT (OUTPATIENT)
Dept: ORTHOPEDIC SURGERY | Facility: CLINIC | Age: 80
End: 2025-05-08
Payer: MEDICARE

## 2025-05-08 VITALS
WEIGHT: 172 LBS | HEIGHT: 68 IN | DIASTOLIC BLOOD PRESSURE: 80 MMHG | BODY MASS INDEX: 26.07 KG/M2 | HEART RATE: 82 BPM | SYSTOLIC BLOOD PRESSURE: 153 MMHG

## 2025-05-08 DIAGNOSIS — M17.11 UNILATERAL PRIMARY OSTEOARTHRITIS, RIGHT KNEE: ICD-10-CM

## 2025-05-08 PROCEDURE — 99213 OFFICE O/P EST LOW 20 MIN: CPT

## 2025-05-21 NOTE — H&P PST ADULT - PSYCHIATRIC
----- Message from Nai Stark MD sent at 5/21/2025  3:30 PM CDT -----  SSA x 3, TA x 1   Repeat colonoscopy in 3 years for surveillance    details… detailed exam

## 2025-06-20 ENCOUNTER — APPOINTMENT (OUTPATIENT)
Dept: ORTHOPEDIC SURGERY | Facility: CLINIC | Age: 80
End: 2025-06-20
Payer: MEDICARE

## 2025-06-20 VITALS — HEIGHT: 68 IN | WEIGHT: 172 LBS | BODY MASS INDEX: 26.07 KG/M2

## 2025-06-20 PROCEDURE — 99213 OFFICE O/P EST LOW 20 MIN: CPT

## 2025-06-26 ENCOUNTER — APPOINTMENT (OUTPATIENT)
Dept: ORTHOPEDIC SURGERY | Facility: CLINIC | Age: 80
End: 2025-06-26
Payer: MEDICARE

## 2025-06-26 ENCOUNTER — NON-APPOINTMENT (OUTPATIENT)
Age: 80
End: 2025-06-26

## 2025-06-26 VITALS
HEART RATE: 80 BPM | WEIGHT: 172 LBS | BODY MASS INDEX: 26.07 KG/M2 | SYSTOLIC BLOOD PRESSURE: 136 MMHG | HEIGHT: 68 IN | DIASTOLIC BLOOD PRESSURE: 80 MMHG

## 2025-06-26 PROCEDURE — 20610 DRAIN/INJ JOINT/BURSA W/O US: CPT | Mod: RT

## 2025-07-23 NOTE — PATIENT PROFILE ADULT - OVER THE PAST TWO WEEKS HAVE YOU FELT DOWN, DEPRESSED OR HOPELESS?
no

## 2025-08-28 ENCOUNTER — APPOINTMENT (OUTPATIENT)
Dept: CT IMAGING | Facility: CLINIC | Age: 80
End: 2025-08-28

## 2025-08-28 ENCOUNTER — OUTPATIENT (OUTPATIENT)
Dept: OUTPATIENT SERVICES | Facility: HOSPITAL | Age: 80
LOS: 1 days | End: 2025-08-28
Payer: MEDICARE

## 2025-08-28 DIAGNOSIS — Z98.890 OTHER SPECIFIED POSTPROCEDURAL STATES: Chronic | ICD-10-CM

## 2025-08-28 DIAGNOSIS — Z90.5 ACQUIRED ABSENCE OF KIDNEY: Chronic | ICD-10-CM

## 2025-08-28 DIAGNOSIS — C34.90 MALIGNANT NEOPLASM OF UNSPECIFIED PART OF UNSPECIFIED BRONCHUS OR LUNG: ICD-10-CM

## 2025-08-28 PROCEDURE — 71250 CT THORAX DX C-: CPT | Mod: 26

## 2025-08-28 PROCEDURE — 71250 CT THORAX DX C-: CPT

## 2025-09-17 ENCOUNTER — APPOINTMENT (OUTPATIENT)
Dept: THORACIC SURGERY | Facility: CLINIC | Age: 80
End: 2025-09-17
Payer: MEDICARE

## 2025-09-17 VITALS
SYSTOLIC BLOOD PRESSURE: 111 MMHG | WEIGHT: 170 LBS | DIASTOLIC BLOOD PRESSURE: 68 MMHG | OXYGEN SATURATION: 97 % | BODY MASS INDEX: 25.76 KG/M2 | HEIGHT: 68 IN | HEART RATE: 88 BPM

## 2025-09-17 DIAGNOSIS — R91.8 OTHER NONSPECIFIC ABNORMAL FINDING OF LUNG FIELD: ICD-10-CM

## 2025-09-17 DIAGNOSIS — C34.90 MALIGNANT NEOPLASM OF UNSPECIFIED PART OF UNSPECIFIED BRONCHUS OR LUNG: ICD-10-CM

## 2025-09-17 PROCEDURE — 99213 OFFICE O/P EST LOW 20 MIN: CPT
